# Patient Record
Sex: MALE | Race: WHITE | Employment: OTHER | URBAN - METROPOLITAN AREA
[De-identification: names, ages, dates, MRNs, and addresses within clinical notes are randomized per-mention and may not be internally consistent; named-entity substitution may affect disease eponyms.]

---

## 2019-04-28 ENCOUNTER — ED HISTORICAL/CONVERTED ENCOUNTER (OUTPATIENT)
Dept: OTHER | Age: 69
End: 2019-04-28

## 2020-01-14 ENCOUNTER — ED HISTORICAL/CONVERTED ENCOUNTER (OUTPATIENT)
Dept: OTHER | Age: 70
End: 2020-01-14

## 2020-02-06 ENCOUNTER — ED HISTORICAL/CONVERTED ENCOUNTER (OUTPATIENT)
Dept: OTHER | Age: 70
End: 2020-02-06

## 2020-07-12 ENCOUNTER — ED HISTORICAL/CONVERTED ENCOUNTER (OUTPATIENT)
Dept: OTHER | Age: 70
End: 2020-07-12

## 2020-10-09 ENCOUNTER — OFFICE VISIT (OUTPATIENT)
Dept: ENT CLINIC | Age: 70
End: 2020-10-09
Payer: MEDICARE

## 2020-10-09 VITALS
HEIGHT: 74 IN | DIASTOLIC BLOOD PRESSURE: 82 MMHG | HEART RATE: 60 BPM | BODY MASS INDEX: 26.44 KG/M2 | WEIGHT: 206 LBS | OXYGEN SATURATION: 100 % | SYSTOLIC BLOOD PRESSURE: 130 MMHG | RESPIRATION RATE: 18 BRPM | TEMPERATURE: 97.7 F

## 2020-10-09 DIAGNOSIS — J30.1 ALLERGIC RHINITIS DUE TO POLLEN, UNSPECIFIED SEASONALITY: ICD-10-CM

## 2020-10-09 DIAGNOSIS — R04.0 EPISTAXIS: ICD-10-CM

## 2020-10-09 DIAGNOSIS — G43.809 OTHER MIGRAINE WITHOUT STATUS MIGRAINOSUS, NOT INTRACTABLE: Primary | ICD-10-CM

## 2020-10-09 DIAGNOSIS — R43.1 OLFACTORY AURA: ICD-10-CM

## 2020-10-09 DIAGNOSIS — H93.13 TINNITUS OF BOTH EARS: ICD-10-CM

## 2020-10-09 PROBLEM — G43.909 MIGRAINE: Status: ACTIVE | Noted: 2020-10-09

## 2020-10-09 PROCEDURE — 99204 OFFICE O/P NEW MOD 45 MIN: CPT | Performed by: OTOLARYNGOLOGY

## 2020-10-09 NOTE — LETTER
10/9/20 Patient: Carolynn Gill YOB: 1950 Date of Visit: 10/9/2020 Bernarda Olivas MD 
[address] [address] VIA Facsimile: [phone] Dear Bernarda Olivas MD, Thank you for referring Mr. Enedina Mena to [address] [place] [address], NOSE, THROAT AND ALLERGY [facility] for evaluation. My notes for this consultation are attached. If you have questions, please do not hesitate to call me. I look forward to following your patient along with you. Sincerely, Jesse Luciano MD

## 2020-10-09 NOTE — PROGRESS NOTES
Subjective: Carolynn Gill   70 y.o.   1950     HPI     Location - nose, head, ears    Quality - headaches, olfactory disturbance, tinnitus    Severity -  moderate    Duration - months/years    Timing - ongoing    Context - pt with known hx tinnitus, migraines in past, had concussion in Jan 2020, since then headaches worse, also having sporadic episodes of olfactory aura with floral then rotting smell also with headache afterward; does have mild seasonal sinus issues, but no chronic drainage nor facial pressure/pain    Modifying Features - worse in Fall/Winter    Associated symptoms/signs - tinnitus      Review of Systems  Review of Systems   Constitutional: Negative for chills and fever. HENT: Positive for nosebleeds and tinnitus. Negative for ear pain and hearing loss. Eyes: Negative for blurred vision and double vision. Respiratory: Negative for cough, sputum production and shortness of breath. Cardiovascular: Negative for chest pain and palpitations. Gastrointestinal: Positive for nausea. Negative for heartburn and vomiting. Musculoskeletal: Negative for joint pain and neck pain. Skin: Negative. Neurological: Positive for headaches. Negative for dizziness, speech change and weakness. Endo/Heme/Allergies: Negative for environmental allergies. Does not bruise/bleed easily. Psychiatric/Behavioral: Negative for memory loss. The patient does not have insomnia. Past Medical History:   Diagnosis Date    HTN (hypertension)     Migraines     Osteitis pubis ([address] 12/2/2015     Past Surgical History:   Procedure Laterality Date    HX CARPAL TUNNEL RELEASE      HX CERVICAL FUSION      HX HERNIA REPAIR        History reviewed. No pertinent family history. Social History     Tobacco Use    Smoking status: Former Smoker    Smokeless tobacco: Never Used   Substance Use Topics    Alcohol use:  Yes     Alcohol/week: 0.0 standard drinks     Comment: one per week      Prior to Admission medications    Medication Sig Start Date End Date Taking? Authorizing Provider   amLODIPine (NORVASC) 5 mg tablet  10/6/15   Provider, Historical   losartan (COZAAR) 50 mg tablet  11/23/15   Provider, Historical   clobetasol (TEMOVATE) 0.05 % external solution  11/24/15   Provider, Historical   BIOFLAV,LEMON/VIT BCOMP&C (LIPO-FLAVONOID PLUS PO) Take  by mouth. Provider, Historical   KRILL/OM-3/DHA/EPA/PHOSPHO/AST (MEGARED OMEGA-3 KRILL OIL PO) Take  by mouth. Provider, Historical   TEA TREE OIL EX by Apply Externally route. Provider, Historical   OTHER  for kidney stones    Provider, Historical        Allergies   Allergen Reactions    Niacin Rash    Oxycodone Other (comments)     Breathing difficulty    Robitussin [Guaifenesin] Other (comments)     Throat swelling         Objective:     Visit Vitals  /82 (BP 1 Location: Left arm, BP Patient Position: Sitting)   Pulse 60   Temp 97.7 °F (36.5 °C) (Oral)   Resp 18   Ht 6' 2\" (1.88 m)   Wt 206 lb (93.4 kg)   SpO2 100%   BMI 26.45 kg/m²        Physical Exam  Vitals signs reviewed. Constitutional:       General: He is awake. Appearance: Normal appearance. He is normal weight. HENT:      Head: Normocephalic and atraumatic. Jaw: There is normal jaw occlusion. No trismus, tenderness or malocclusion. Salivary Glands: Right salivary gland is not diffusely enlarged or tender. Left salivary gland is not diffusely enlarged or tender. Right Ear: Hearing, tympanic membrane, ear canal and external ear normal.      Left Ear: Hearing, tympanic membrane, ear canal and external ear normal.      Ears:      Dawn exam findings: does not lateralize. Right Rinne: AC > BC. Left Rinne: AC > BC. Nose: No septal deviation, mucosal edema or rhinorrhea. Right Turbinates: Not enlarged, swollen or pale. Left Turbinates: Not enlarged, swollen or pale.       Right Sinus: No maxillary sinus tenderness or frontal sinus tenderness. Left Sinus: No maxillary sinus tenderness or frontal sinus tenderness. Comments: Left telangiectasia     Mouth/Throat:      Lips: Pink. Mouth: Mucous membranes are moist. No oral lesions. Dentition: Normal dentition. No gum lesions. Tongue: No lesions. Palate: No mass and lesions. Pharynx: Oropharynx is clear. Uvula midline. Tonsils: No tonsillar exudate. 0 on the right. 0 on the left. Eyes:      General: Vision grossly intact. Extraocular Movements: Extraocular movements intact. Right eye: No nystagmus. Left eye: No nystagmus. Pupils: Pupils are equal, round, and reactive to light. Neck:      Musculoskeletal: Normal range of motion. No edema or erythema. Thyroid: No thyroid mass, thyromegaly or thyroid tenderness. Trachea: Trachea and phonation normal. No tracheal tenderness. Cardiovascular:      Rate and Rhythm: Normal rate and regular rhythm. Pulmonary:      Effort: Pulmonary effort is normal.      Breath sounds: Normal breath sounds. No stridor. No wheezing. Musculoskeletal: Normal range of motion. Lymphadenopathy:      Cervical: No cervical adenopathy. Skin:     General: Skin is warm and dry. Neurological:      General: No focal deficit present. Mental Status: He is alert and oriented to person, place, and time. Mental status is at baseline. Cranial Nerves: Cranial nerves are intact. Coordination: Romberg sign negative. Gait: Gait is intact. Comments: Negative Hallpike   Psychiatric:         Mood and Affect: Mood normal.         Behavior: Behavior normal. Behavior is cooperative. Assessment/Plan:     Encounter Diagnoses   Name Primary?     Other migraine without status migrainosus, not intractable Yes    Epistaxis     Tinnitus of both ears     Allergic rhinitis due to pollen, unspecified seasonality     Olfactory aura      I reviewed head CT from Jan and Feb 2020  There is mild chronic sinus thickening bilateral maxillary sinus, no polyp/mass nor skull based lesion  His olfaction sx are aura related, not from sinus pathology  If symptoms progress will f/u, otherwise rec neurology followup    No orders of the defined types were placed in this encounter. Follow-up and Dispositions    · Return if symptoms worsen or fail to improve.

## 2021-04-04 ENCOUNTER — HOSPITAL ENCOUNTER (EMERGENCY)
Age: 71
Discharge: HOME | End: 2021-04-05
Attending: EMERGENCY MEDICINE
Payer: MEDICARE

## 2021-04-04 DIAGNOSIS — I10 HYPERTENSION, UNSPECIFIED TYPE: Primary | ICD-10-CM

## 2021-04-04 PROCEDURE — 99283 EMERGENCY DEPT VISIT LOW MDM: CPT

## 2021-04-04 RX ORDER — AZELASTINE HCL 205.5 UG/1
1 SPRAY NASAL 2 TIMES DAILY
COMMUNITY

## 2021-04-05 VITALS
OXYGEN SATURATION: 98 % | DIASTOLIC BLOOD PRESSURE: 80 MMHG | BODY MASS INDEX: 27.83 KG/M2 | HEART RATE: 85 BPM | RESPIRATION RATE: 18 BRPM | WEIGHT: 210 LBS | HEIGHT: 73 IN | SYSTOLIC BLOOD PRESSURE: 147 MMHG | TEMPERATURE: 98.3 F

## 2021-04-05 LAB
ANION GAP SERPL CALC-SCNC: 7 MMOL/L (ref 5–15)
ATRIAL RATE: 67 BPM
BASOPHILS # BLD: 0 K/UL (ref 0–0.1)
BASOPHILS NFR BLD: 0 % (ref 0–1)
BUN SERPL-MCNC: 25 MG/DL (ref 6–20)
BUN/CREAT SERPL: 26 (ref 12–20)
CA-I BLD-MCNC: 8.6 MG/DL (ref 8.5–10.1)
CALCULATED P AXIS, ECG09: 32 DEGREES
CALCULATED R AXIS, ECG10: -34 DEGREES
CALCULATED T AXIS, ECG11: 18 DEGREES
CHLORIDE SERPL-SCNC: 103 MMOL/L (ref 97–108)
CO2 SERPL-SCNC: 29 MMOL/L (ref 21–32)
CREAT SERPL-MCNC: 0.98 MG/DL (ref 0.7–1.3)
DIAGNOSIS, 93000: NORMAL
DIFFERENTIAL METHOD BLD: ABNORMAL
EOSINOPHIL # BLD: 0.3 K/UL (ref 0–0.4)
EOSINOPHIL NFR BLD: 5 % (ref 0–7)
ERYTHROCYTE [DISTWIDTH] IN BLOOD BY AUTOMATED COUNT: 12 % (ref 11.5–14.5)
GLUCOSE SERPL-MCNC: 123 MG/DL (ref 65–100)
HCT VFR BLD AUTO: 44 % (ref 36.6–50.3)
HGB BLD-MCNC: 14.6 G/DL (ref 12.1–17)
IMM GRANULOCYTES # BLD AUTO: 0 K/UL (ref 0–0.04)
IMM GRANULOCYTES NFR BLD AUTO: 0 % (ref 0–0.5)
LYMPHOCYTES # BLD: 1.2 K/UL (ref 0.8–3.5)
LYMPHOCYTES NFR BLD: 22 % (ref 12–49)
MCH RBC QN AUTO: 30.6 PG (ref 26–34)
MCHC RBC AUTO-ENTMCNC: 33.2 G/DL (ref 30–36.5)
MCV RBC AUTO: 92.2 FL (ref 80–99)
MONOCYTES # BLD: 0.8 K/UL (ref 0–1)
MONOCYTES NFR BLD: 15 % (ref 5–13)
NEUTS SEG # BLD: 3 K/UL (ref 1.8–8)
NEUTS SEG NFR BLD: 58 % (ref 32–75)
P-R INTERVAL, ECG05: 174 MS
PLATELET # BLD AUTO: 108 K/UL (ref 150–400)
PMV BLD AUTO: 10.7 FL (ref 8.9–12.9)
POTASSIUM SERPL-SCNC: 4.2 MMOL/L (ref 3.5–5.1)
Q-T INTERVAL, ECG07: 370 MS
QRS DURATION, ECG06: 84 MS
QTC CALCULATION (BEZET), ECG08: 390 MS
RBC # BLD AUTO: 4.77 M/UL (ref 4.1–5.7)
SODIUM SERPL-SCNC: 139 MMOL/L (ref 136–145)
TROPONIN I SERPL-MCNC: <0.05 NG/ML
VENTRICULAR RATE, ECG03: 67 BPM
WBC # BLD AUTO: 5.3 K/UL (ref 4.1–11.1)

## 2021-04-05 PROCEDURE — 93005 ELECTROCARDIOGRAM TRACING: CPT

## 2021-04-05 PROCEDURE — 84484 ASSAY OF TROPONIN QUANT: CPT

## 2021-04-05 PROCEDURE — 85025 COMPLETE CBC W/AUTO DIFF WBC: CPT

## 2021-04-05 PROCEDURE — 80048 BASIC METABOLIC PNL TOTAL CA: CPT

## 2021-04-05 NOTE — ED TRIAGE NOTES
Pt c/o elevated blood pressure and general complaints of not feeling well; recently switched BP meds

## 2021-04-05 NOTE — ED PROVIDER NOTES
EMERGENCY DEPARTMENT HISTORY AND PHYSICAL EXAM      Date: 4/4/2021  Patient Name: Yadira Moore    History of Presenting Illness     Chief Complaint   Patient presents with    Hypertension       History Provided By: Patient    HPI: Yadira Moore, 78 y.o. male   presents to the ED with cc of hypertension. Patient with a history of hypertension came to emergency room complaining of elevated blood pressure reading with a systolic greater than 928 just prior to arrival.  Patient states that patient was resting at home felt mild throbbing of the head. Patient took the blood pressure reading at the time and the systolic blood pressure greater 190. Patient states that he is in process of readjusting his antihypertensive by his cardiologist.  Patient is currently on losartan and he has been compliant with the medication. Patient is also is actively treated by allergist for his severe seasonal allergy. PCP: Ada Mosley, DO    No current facility-administered medications on file prior to encounter. Current Outpatient Medications on File Prior to Encounter   Medication Sig Dispense Refill    azelastine (ASTEPRO) 205.5 mcg (0.15 %) 1 [place] by Both Nostrils route two (2) times a day.  losartan (COZAAR) 50 mg tablet 100 mg.  clobetasol (TEMOVATE) 0.05 % external solution       BIOFLAV,LEMON/VIT BCOMP&C (LIPO-FLAVONOID PLUS PO) Take  by mouth.  KRILL/OM-3/DHA/EPA/PHOSPHO/AST (MEGARED OMEGA-3 KRILL OIL PO) Take  by mouth.  TEA TREE OIL EX by Apply Externally route.  OTHER  for kidney stones         Past History     Past Medical History:  Past Medical History:   Diagnosis Date    HTN (hypertension)     Migraines     Osteitis [address]is ([facility] [address] 12/2/2015       Past Surgical History:  Past Surgical History:   Procedure Laterality Date    HX CARPAL TUNNEL RELEASE      HX CERVICAL FUSION      HX HERNIA REPAIR         Family History:  History reviewed.  No pertinent family history. Social History:  Social History     Tobacco Use    Smoking status: Former Smoker    Smokeless tobacco: Never Used   Substance Use Topics    Alcohol use: Yes     Alcohol/week: 0.0 standard drinks     Comment: one per week    Drug use: Not on file       Allergies: Allergies   Allergen Reactions    Metoprolol Unknown (comments)    Niacin Rash    Oxycodone Other (comments)     Breathing difficulty    Robitussin [Guaifenesin] Other (comments)     Throat swelling         Review of Systems   Review of Systems   Constitutional: Negative for chills and fever. HENT: Negative for sore throat. Eyes: Negative for visual disturbance. Respiratory: Negative for cough and shortness of breath. Cardiovascular: Negative for chest pain and leg swelling. Gastrointestinal: Negative for abdominal pain and vomiting. Endocrine: Negative for polyuria. Genitourinary: Negative for difficulty urinating. Musculoskeletal: Negative for arthralgias. Skin: Negative for rash. Neurological: Negative for headaches. Psychiatric/Behavioral: Negative for suicidal ideas. All other systems reviewed and are negative. Physical Exam   Physical Exam  Vitals signs and nursing note reviewed. Constitutional:       Appearance: Normal appearance. HENT:      Head: Normocephalic and atraumatic. Nose: No congestion. Mouth/Throat:      Mouth: Mucous membranes are moist.   Eyes:      General: No scleral icterus. Conjunctiva/sclera: Conjunctivae normal.   Neck:      Musculoskeletal: Neck supple. Cardiovascular:      Rate and Rhythm: Normal rate and regular rhythm. Heart sounds: Normal heart sounds. Pulmonary:      Effort: Pulmonary effort is normal.      Breath sounds: Normal breath sounds. Abdominal:      General: Abdomen is flat. Bowel sounds are normal.      Palpations: Abdomen is soft. Musculoskeletal:         General: No swelling. Right lower leg: No edema.       Left lower leg: No edema. Skin:     General: Skin is warm and dry. Comments: Generalized erythema. Patient states that it is at his baseline due to seasonal allergies. Neurological:      General: No focal deficit present. Mental Status: He is alert. Psychiatric:         Mood and Affect: Mood normal.         Diagnostic Study Results     Labs -     Recent Results (from the past 12 hour(s))   CBC WITH AUTOMATED DIFF    Collection Time: 04/05/21 12:15 AM   Result Value Ref Range    WBC 5.3 4.1 - 11.1 K/uL    RBC 4.77 4.10 - 5.70 M/uL    HGB 14.6 12.1 - 17.0 g/dL    HCT 44.0 36.6 - 50.3 %    MCV 92.2 80.0 - 99.0 FL    MCH 30.6 26.0 - 34.0 PG    MCHC 33.2 30.0 - 36.5 g/dL    RDW 12.0 11.5 - 14.5 %    PLATELET 290 (L) 728 - 400 K/uL    MPV 10.7 8.9 - 12.9 FL    NEUTROPHILS 58 32 - 75 %    LYMPHOCYTES 22 12 - 49 %    MONOCYTES 15 (H) 5 - 13 %    EOSINOPHILS 5 0 - 7 %    BASOPHILS 0 0 - 1 %    IMMATURE GRANULOCYTES 0 0.0 - 0.5 %    ABS. NEUTROPHILS 3.0 1.8 - 8.0 K/UL    ABS. LYMPHOCYTES 1.2 0.8 - 3.5 K/UL    ABS. MONOCYTES 0.8 0.0 - 1.0 K/UL    ABS. EOSINOPHILS 0.3 0.0 - 0.4 K/UL    ABS. BASOPHILS 0.0 0.0 - 0.1 K/UL    ABS. IMM.  GRANS. 0.0 0.00 - 0.04 K/UL    DF AUTOMATED     METABOLIC PANEL, BASIC    Collection Time: 04/05/21 12:15 AM   Result Value Ref Range    Sodium 139 136 - 145 mmol/L    Potassium 4.2 3.5 - 5.1 mmol/L    Chloride 103 97 - 108 mmol/L    CO2 29 21 - 32 mmol/L    Anion gap 7 5 - 15 mmol/L    Glucose 123 (H) 65 - 100 mg/dL    BUN 25 (H) 6 - 20 mg/dL    Creatinine 0.98 0.70 - 1.30 mg/dL    BUN/Creatinine ratio 26 (H) 12 - 20      GFR est AA >60 >60 ml/min/1.73m2    GFR est non-AA >60 >60 ml/min/1.73m2    Calcium 8.6 8.5 - 10.1 mg/dL   TROPONIN I    Collection Time: 04/05/21 12:15 AM   Result Value Ref Range    Troponin-I, Qt. <0.05 <0.05 ng/mL       Radiologic Studies -   No orders to display     CT Results  (Last 48 hours)    None        CXR Results  (Last 48 hours)    None            Medical Decision Making   I am the first provider for this patient. I reviewed the vital signs, available nursing notes, past medical history, past surgical history, family history and social history. Vital Signs-Reviewed the patient's vital signs. Patient Vitals for the past 12 hrs:   Temp Pulse Resp BP SpO2   04/05/21 0058    (!) 147/80    04/04/21 2342 98.3 °F (36.8 °C) 85 18 (!) 154/95 98 %       Records Reviewed:     Provider Notes (Medical Decision Making):       ED Course:   Initial assessment performed. The patients presenting problems have been discussed, and they are in agreement with the care plan formulated and outlined with them. I have encouraged them to ask questions as they arise throughout their visit. ED Course as of Apr 05 0107   Mon Apr 05, 2021   0026 Normal sinus rhythm at 67 left axis deviation normal QT no ectopy nonspecific ST-T wave changes no STEMI    [SK]      ED Course User Index  [SK] Felipa Figueroa MD     Blood pressure improved. No apparent distress. No respiratory distress. No complaint of headache or chest pain or shortness of breath. Neuro grossly nonfocal.    PROCEDURES      Disposition: Condition stable   DC- Adult Discharges: All of the diagnostic tests were reviewed and questions answered. Diagnosis, care plan and treatment options were discussed. understand instructions and will follow up as directed. The patients results have been reviewed with them. They have been counseled regarding their diagnosis. The patient verbally convey understanding and agreement of the signs, symptoms, diagnosis, treatment and prognosis and additionally agrees to follow up as recommended. They also agree with the care-plan and convey that all of their questions have been answered.   I have also put together some discharge instructions for them that include: 1) educational information regarding their diagnosis, 2) how to care for their diagnosis at home, as well a 3) list of reasons why they would want to return to the ED prior to their follow-up appointment, should their condition change. PLAN:  1. Current Discharge Medication List        2. Follow-up Information     Follow up With Specialties Details Why Contact Info    Follow up with your primary care physician  Schedule an appointment as soon as possible for a visit in 3 days As needed         Return to ED if worse     Diagnosis     Clinical Impression:   1. Hypertension, unspecified type        Please note that this dictation was completed with [employer], the computer voice recognition software. Quite often unanticipated grammatical, syntax, homophones, and other interpretive errors are inadvertently transcribed by the computer software. Please disregard these errors. Please excuse any errors that have escaped final proofreading. Thank you.

## 2022-03-18 PROBLEM — G43.909 MIGRAINE: Status: ACTIVE | Noted: 2020-10-09

## 2022-03-18 PROBLEM — R04.0 EPISTAXIS: Status: ACTIVE | Noted: 2020-10-09

## 2022-03-18 PROBLEM — J30.1 ALLERGIC RHINITIS DUE TO POLLEN: Status: ACTIVE | Noted: 2020-10-09

## 2022-03-19 PROBLEM — H93.13 TINNITUS OF BOTH EARS: Status: ACTIVE | Noted: 2020-10-09

## 2022-03-19 PROBLEM — R43.1 OLFACTORY AURA: Status: ACTIVE | Noted: 2020-10-09

## 2022-06-21 ENCOUNTER — HOSPITAL ENCOUNTER (EMERGENCY)
Age: 72
Discharge: HOME | End: 2022-06-21
Attending: EMERGENCY MEDICINE
Payer: MEDICARE

## 2022-06-21 ENCOUNTER — APPOINTMENT (OUTPATIENT)
Dept: CT IMAGING | Age: 72
End: 2022-06-21
Attending: EMERGENCY MEDICINE
Payer: MEDICARE

## 2022-06-21 ENCOUNTER — APPOINTMENT (OUTPATIENT)
Dept: GENERAL RADIOLOGY | Age: 72
End: 2022-06-21
Attending: STUDENT IN AN ORGANIZED HEALTH CARE EDUCATION/TRAINING PROGRAM
Payer: MEDICARE

## 2022-06-21 VITALS
HEART RATE: 64 BPM | WEIGHT: 200 LBS | RESPIRATION RATE: 16 BRPM | SYSTOLIC BLOOD PRESSURE: 132 MMHG | DIASTOLIC BLOOD PRESSURE: 82 MMHG | BODY MASS INDEX: 26.51 KG/M2 | OXYGEN SATURATION: 99 % | HEIGHT: 73 IN | TEMPERATURE: 97.8 F

## 2022-06-21 DIAGNOSIS — R06.02 SOB (SHORTNESS OF BREATH): Primary | ICD-10-CM

## 2022-06-21 LAB
ALBUMIN SERPL-MCNC: 4.3 G/DL (ref 3.5–5)
ALBUMIN/GLOB SERPL: 1.1 {RATIO} (ref 1.1–2.2)
ALP SERPL-CCNC: 84 U/L (ref 45–117)
ALT SERPL-CCNC: 36 U/L (ref 12–78)
ANION GAP SERPL CALC-SCNC: 6 MMOL/L (ref 5–15)
AST SERPL W P-5'-P-CCNC: 21 U/L (ref 15–37)
BASOPHILS # BLD: 0 K/UL (ref 0–0.1)
BASOPHILS NFR BLD: 0 % (ref 0–1)
BILIRUB SERPL-MCNC: 0.8 MG/DL (ref 0.2–1)
BUN SERPL-MCNC: 23 MG/DL (ref 6–20)
BUN/CREAT SERPL: 27 (ref 12–20)
CA-I BLD-MCNC: 9.4 MG/DL (ref 8.5–10.1)
CHLORIDE SERPL-SCNC: 104 MMOL/L (ref 97–108)
CO2 SERPL-SCNC: 29 MMOL/L (ref 21–32)
CREAT SERPL-MCNC: 0.85 MG/DL (ref 0.7–1.3)
DIFFERENTIAL METHOD BLD: ABNORMAL
EOSINOPHIL # BLD: 0.3 K/UL (ref 0–0.4)
EOSINOPHIL NFR BLD: 4 % (ref 0–7)
ERYTHROCYTE [DISTWIDTH] IN BLOOD BY AUTOMATED COUNT: 12.1 % (ref 11.5–14.5)
GLOBULIN SER CALC-MCNC: 4 G/DL (ref 2–4)
GLUCOSE SERPL-MCNC: 106 MG/DL (ref 65–100)
HCT VFR BLD AUTO: 45.9 % (ref 36.6–50.3)
HGB BLD-MCNC: 15.2 G/DL (ref 12.1–17)
IMM GRANULOCYTES # BLD AUTO: 0 K/UL (ref 0–0.04)
IMM GRANULOCYTES NFR BLD AUTO: 0 % (ref 0–0.5)
LYMPHOCYTES # BLD: 2.2 K/UL (ref 0.8–3.5)
LYMPHOCYTES NFR BLD: 29 % (ref 12–49)
MCH RBC QN AUTO: 30.5 PG (ref 26–34)
MCHC RBC AUTO-ENTMCNC: 33.1 G/DL (ref 30–36.5)
MCV RBC AUTO: 92.2 FL (ref 80–99)
MONOCYTES # BLD: 0.9 K/UL (ref 0–1)
MONOCYTES NFR BLD: 12 % (ref 5–13)
NEUTS SEG # BLD: 4.1 K/UL (ref 1.8–8)
NEUTS SEG NFR BLD: 55 % (ref 32–75)
NRBC # BLD: 0 K/UL (ref 0–0.01)
NRBC BLD-RTO: 0 PER 100 WBC
PLATELET # BLD AUTO: 143 K/UL (ref 150–400)
PMV BLD AUTO: 11.2 FL (ref 8.9–12.9)
POTASSIUM SERPL-SCNC: 4.8 MMOL/L (ref 3.5–5.1)
PROT SERPL-MCNC: 8.3 G/DL (ref 6.4–8.2)
RBC # BLD AUTO: 4.98 M/UL (ref 4.1–5.7)
SODIUM SERPL-SCNC: 139 MMOL/L (ref 136–145)
TROPONIN-HIGH SENSITIVITY: 7 NG/L (ref 0–76)
WBC # BLD AUTO: 7.5 K/UL (ref 4.1–11.1)

## 2022-06-21 PROCEDURE — 99285 EMERGENCY DEPT VISIT HI MDM: CPT

## 2022-06-21 PROCEDURE — 80053 COMPREHEN METABOLIC PANEL: CPT

## 2022-06-21 PROCEDURE — 93005 ELECTROCARDIOGRAM TRACING: CPT

## 2022-06-21 PROCEDURE — 85025 COMPLETE CBC W/AUTO DIFF WBC: CPT

## 2022-06-21 PROCEDURE — 71045 X-RAY EXAM CHEST 1 VIEW: CPT

## 2022-06-21 PROCEDURE — 36415 COLL VENOUS BLD VENIPUNCTURE: CPT

## 2022-06-21 PROCEDURE — 84484 ASSAY OF TROPONIN QUANT: CPT

## 2022-06-21 PROCEDURE — 71275 CT ANGIOGRAPHY CHEST: CPT

## 2022-06-21 PROCEDURE — 74011250637 HC RX REV CODE- 250/637: Performed by: EMERGENCY MEDICINE

## 2022-06-21 PROCEDURE — 74011000636 HC RX REV CODE- 636: Performed by: EMERGENCY MEDICINE

## 2022-06-21 RX ORDER — ASPIRIN 325 MG
325 TABLET ORAL ONCE
Status: COMPLETED | OUTPATIENT
Start: 2022-06-21 | End: 2022-06-21

## 2022-06-21 RX ADMIN — ASPIRIN 325 MG ORAL TABLET 325 MG: 325 PILL ORAL at 17:51

## 2022-06-21 RX ADMIN — IOPAMIDOL 100 ML: 755 INJECTION, SOLUTION INTRAVENOUS at 20:05

## 2022-06-21 NOTE — ED TRIAGE NOTES
[facility] pt stated that he felt a chill that went from his left arm to his right arm and SOB; pt stated that he rested a bit but then pain started, spoke with his PCP who told him to go to an urgent care center who after evaluating pt, recommended that he come to the ED for a further workup

## 2022-06-21 NOTE — ED PROVIDER NOTES
EMERGENCY DEPARTMENT HISTORY AND PHYSICAL EXAM      Date: 6/21/2022  Patient Name: Tim Conklin      History of Presenting Illness     Chief Complaint   Patient presents with    Chest Pain       History Provided By: Patient    HPI: Tim Conklin, 81 y.o. male with a past medical history significant for HTN, anxiety presents to the ED with cc of chest pain, sob. Endorsing episode of \"chill across my chest and arms\" this AM that went away, then tried to go up the stairs and had worsening CP and SOB. Continued central chest pain and SOB now even at rest. Denies LE edema or hx of blood clots. Denies cough, N/V/D. States he has had this before and was told it was anxiety. Went to urgent care where he had labs and EKG and told to go to ER for further workup of possible blood clot. +Sick contact in daughter with ear infection. There are no other complaints, changes, or physical findings at this time. PCP: Andrzej Infante,     Current Facility-Administered Medications   Medication Dose Route Frequency Provider Last Rate Last Admin    aspirin tablet 325 mg  325 mg Oral ONCE Bridgette Dong MD         Current Outpatient Medications   Medication Sig Dispense Refill    azelastine (ASTEPRO) 205.5 mcg (0.15 %) 1 [place] by Both Nostrils route two (2) times a day. losartan (COZAAR) 50 mg tablet 100 mg.      clobetasol (TEMOVATE) 0.05 % external solution       BIOFLAV,LEMON/VIT BCOMP&C (LIPO-FLAVONOID PLUS PO) Take  by mouth. KRILL/OM-3/DHA/EPA/PHOSPHO/AST (MEGARED OMEGA-3 KRILL OIL PO) Take  by mouth. TEA TREE OIL EX by Apply Externally route.       OTHER  for kidney stones         Past History     Past Medical History:  Past Medical History:   Diagnosis Date    HTN (hypertension)     Migraines     Osteitis pubis ([address] 12/2/2015       Past Surgical History:  Past Surgical History:   Procedure Laterality Date    HX CARPAL TUNNEL RELEASE      HX CERVICAL FUSION      HX HERNIA REPAIR Family History:  No family history on file. Social History:  Social History     Tobacco Use    Smoking status: Former Smoker    Smokeless tobacco: Never Used   Substance Use Topics    Alcohol use: Yes     Alcohol/week: 0.0 standard drinks     Comment: one per week    Drug use: Not on file       Allergies: Allergies   Allergen Reactions    Metoprolol Unknown (comments)    Niacin Rash    Oxycodone Other (comments)     Breathing difficulty    Robitussin [Guaifenesin] Other (comments)     Throat swelling         Review of Systems   Constitutional: Negative except as in HPI. Eyes: Negative except as in HPI.  ENT: Negative except as in HPI. Cardiovascular: Negative except as in HPI. Respiratory: Negative except as in HPI. Gastrointestinal: Negative except as in HPI. Genitourinary: Negative except as in HPI. Musculoskeletal: Negative except as in HPI. Integumentary: Negative except as in HPI. Neurological: Negative except as in HPI. Psychiatric: Negative except as in HPI. Endocrine: Negative except as in HPI. Hematologic/Lymphatic: Negative except as in HPI. Allergic/Immunologic: Negative except as in HPI. Physical Exam   Constitutional: Awake and alert, interactive, NAD  Eyes: PERRL, no injection or scleral icterus, no discharge  HEENT: NCAT, neck supple, MMM, no oropharyngeal exudates  CV: RRR, no m/r/g  Respiratory: CTAB, no r/r/w  GI: Abd soft, nondistended, nontender  : Deferred  MSK: FROM, no joint effusions or edema  Skin: No rashes  Neuro: CN2-12 intact, symmetric facies, fluent speech. Psych: Well-groomed, normal speech, behavior, appropriate mood    Lab and Diagnostic Study Results     Labs -   No results found for this or any previous visit (from the past 12 hour(s)).     Radiologic Studies -   [unfilled]  CT Results  (Last 48 hours)      None          CXR Results  (Last 48 hours)                 06/21/22 1639  XR CHEST PORT Final result    Impression:  The cardiomediastinal silhouette is appropriate for age, technique,   and lung expansion. Pulmonary vasculature is not congested. The lungs are   essentially clear. No effusion or pneumothorax is seen. Narrative:  1 view                   Medical Decision Making and ED Course   - I am the first and primary provider for this patient AND AM THE PRIMARY PROVIDER OF RECORD. - I reviewed the vital signs, available nursing notes, past medical history, past surgical history, family history and social history. - Initial assessment performed. The patients presenting problems have been discussed, and the staff are in agreement with the care plan formulated and outlined with them. I have encouraged them to ask questions as they arise throughout their visit. Vital Signs-Reviewed the patient's vital signs. Patient Vitals for the past 12 hrs:   Temp Pulse Resp BP SpO2   06/21/22 1615 97.7 °F (36.5 °C) 69 16 136/84 98 %       EKG interpretation: [email or web address] w/single PVC, nl QRS/QTc, left axis, no ADALID/STD or nonanatomic TWI. Provider Notes (Medical Decision Making):   72M w/CP/SOB, r/o ACS. EKG nonischemic but will give ASA. Less likely PE but given continued sx here even at rest will get CTA PE to r/o. Dispo pending workup. ED Course:       ED Course as of 06/22/22 1741   Tue Jun 21, 2022   1708 XR CHEST PORT    IMPRESSION  The cardiomediastinal silhouette is appropriate for age, technique,  and lung expansion. Pulmonary vasculature is not congested. The lungs are  essentially clear. No effusion or pneumothorax is seen. [YA]   1911 Troponin-High Sensitivity: 7 [YA]   200 81year old male with recent sob, chilld, cough. Here to be evalauted for PE. Pending CTA chest, repeat troponin   [KK]   2048 CTA negative. Patient updated of results.  No further questions, comfortable with discharge [KK]      ED Course User Index  [KK] Torres Erazo MD  [YA] Kavya Rivas MD             Disposition     Disposition: DC- Adult Discharges: All of the diagnostic tests were reviewed and questions answered. Diagnosis, care plan and treatment options were discussed. The patient understands the instructions and will follow up as directed. The patients results have been reviewed with them. They have been counseled regarding their diagnosis. The patient verbally convey understanding and agreement of the signs, symptoms, diagnosis, treatment and prognosis and additionally agrees to follow up as recommended with their PCP in 24 - 48 hours. They also agree with the care-plan and convey that all of their questions have been answered. I have also put together some discharge instructions for them that include: 1) educational information regarding their diagnosis, 2) how to care for their diagnosis at home, as well a 3) list of reasons why they would want to return to the ED prior to their follow-up appointment, should their condition change. Discharged      Diagnosis     Clinical Impression:   1. SOB (shortness of breath)        Attestations:     Frank Wolf MD

## 2022-06-22 LAB
ATRIAL RATE: 76 BPM
ATRIAL RATE: 86 BPM
CALCULATED P AXIS, ECG09: 47 DEGREES
CALCULATED P AXIS, ECG09: 68 DEGREES
CALCULATED R AXIS, ECG10: -39 DEGREES
CALCULATED R AXIS, ECG10: 64 DEGREES
CALCULATED T AXIS, ECG11: 47 DEGREES
CALCULATED T AXIS, ECG11: 49 DEGREES
DIAGNOSIS, 93000: NORMAL
DIAGNOSIS, 93000: NORMAL
P-R INTERVAL, ECG05: 148 MS
P-R INTERVAL, ECG05: 160 MS
Q-T INTERVAL, ECG07: 358 MS
Q-T INTERVAL, ECG07: 360 MS
QRS DURATION, ECG06: 72 MS
QRS DURATION, ECG06: 78 MS
QTC CALCULATION (BEZET), ECG08: 405 MS
QTC CALCULATION (BEZET), ECG08: 428 MS
VENTRICULAR RATE, ECG03: 76 BPM
VENTRICULAR RATE, ECG03: 86 BPM

## 2022-06-22 NOTE — DISCHARGE INSTRUCTIONS
Thank you! Thank you for allowing me to care for you in the emergency department. I sincerely hope that you are satisfied with your visit today. It is my goal to provide you with excellent care. Below you will find a list of your labs and imaging from your visit today. Should you have any questions regarding these results please do not hesitate to call the emergency department. Labs -     Recent Results (from the past 12 hour(s))   CBC WITH AUTOMATED DIFF    Collection Time: 06/21/22  5:59 PM   Result Value Ref Range    WBC 7.5 4.1 - 11.1 K/uL    RBC 4.98 4.10 - 5.70 M/uL    HGB 15.2 12.1 - 17.0 g/dL    HCT 45.9 36.6 - 50.3 %    MCV 92.2 80.0 - 99.0 FL    MCH 30.5 26.0 - 34.0 PG    MCHC 33.1 30.0 - 36.5 g/dL    RDW 12.1 11.5 - 14.5 %    PLATELET 846 (L) 743 - 400 K/uL    MPV 11.2 8.9 - 12.9 FL    NRBC 0.0 0.0  WBC    ABSOLUTE NRBC 0.00 0.00 - 0.01 K/uL    NEUTROPHILS 55 32 - 75 %    LYMPHOCYTES 29 12 - 49 %    MONOCYTES 12 5 - 13 %    EOSINOPHILS 4 0 - 7 %    BASOPHILS 0 0 - 1 %    IMMATURE GRANULOCYTES 0 0 - 0.5 %    ABS. NEUTROPHILS 4.1 1.8 - 8.0 K/UL    ABS. LYMPHOCYTES 2.2 0.8 - 3.5 K/UL    ABS. MONOCYTES 0.9 0.0 - 1.0 K/UL    ABS. EOSINOPHILS 0.3 0.0 - 0.4 K/UL    ABS. BASOPHILS 0.0 0.0 - 0.1 K/UL    ABS. IMM. GRANS. 0.0 0.00 - 0.04 K/UL    DF AUTOMATED     METABOLIC PANEL, COMPREHENSIVE    Collection Time: 06/21/22  5:59 PM   Result Value Ref Range    Sodium 139 136 - 145 mmol/L    Potassium 4.8 3.5 - 5.1 mmol/L    Chloride 104 97 - 108 mmol/L    CO2 29 21 - 32 mmol/L    Anion gap 6 5 - 15 mmol/L    Glucose 106 (H) 65 - 100 mg/dL    BUN 23 (H) 6 - 20 mg/dL    Creatinine 0.85 0.70 - 1.30 mg/dL    BUN/Creatinine ratio 27 (H) 12 - 20      GFR est AA >60 >60 ml/min/1.73m2    GFR est non-AA >60 >60 ml/min/1.73m2    Calcium 9.4 8.5 - 10.1 mg/dL    Bilirubin, total 0.8 0.2 - 1.0 mg/dL    AST (SGOT) 21 15 - 37 U/L    ALT (SGPT) 36 12 - 78 U/L    Alk.  phosphatase 84 45 - 117 U/L    Protein, total 8.3 (H) 6.4 - 8.2 g/dL    Albumin 4.3 3.5 - 5.0 g/dL    Globulin 4.0 2.0 - 4.0 g/dL    A-G Ratio 1.1 1.1 - 2.2     TROPONIN-HIGH SENSITIVITY    Collection Time: 06/21/22  5:59 PM   Result Value Ref Range    Troponin-High Sensitivity 7 0 - 76 ng/L       Radiologic Studies -   CTA CHEST W OR W WO CONT   Final Result   No acute cardiopulmonary process. No PE. Clear lungs. XR CHEST PORT   Final Result   The cardiomediastinal silhouette is appropriate for age, technique,   and lung expansion. Pulmonary vasculature is not congested. The lungs are   essentially clear. No effusion or pneumothorax is seen. CT Results  (Last 48 hours)                 06/21/22 2004  CTA CHEST W OR W WO CONT Final result    Impression:  No acute cardiopulmonary process. No PE. Clear lungs. Narrative:  Technique:      Axial contrast-enhanced axial images with multiplanar and MIP reformats of   the chest according to CT pulmonary angiogram protocol. 100 cc of Isovue-370   used for IV contrast.          Dose Reduction:  All CT scans at this facility are performed using dose   reduction optimization techniques as appropriate to a performed exam including   the following: Automated exposure control, adjustments of the mA and/or kV   according to patient size, or use of iterative reconstruction technique. Comparison:  Chest x-ray June 21, 2022       CT chest :   No PE. Normal cardiac size. Normal mediastinal structures. No mass or   lymphadenopathy. There is mild breathing motion artifact present. Lungs appear   grossly clear. No infiltrate. No pneumothorax or effusion. Unremarkable upper   abdominal images. CXR Results  (Last 48 hours)                 06/21/22 1639  XR CHEST PORT Final result    Impression:  The cardiomediastinal silhouette is appropriate for age, technique,   and lung expansion. Pulmonary vasculature is not congested. The lungs are   essentially clear. No effusion or pneumothorax is seen. Narrative:  1 view                      If you feel that you have not received excellent quality care or timely care, please ask to speak to the nurse manager. Please choose us in the future for your continued health care needs. ------------------------------------------------------------------------------------------------------------  The exam and treatment you received in the Emergency Department were for an urgent problem and are not intended as complete care. It is important that you follow-up with a doctor, nurse practitioner, or physician assistant to:  (1) confirm your diagnosis,  (2) re-evaluation of changes in your illness and treatment, and  (3) for ongoing care. If your symptoms become worse or you do not improve as expected and you are unable to reach your usual health care provider, you should return to the Emergency Department. We are available 24 hours a day. Please take your discharge instructions with you when you go to your follow-up appointment. If you have any problem arranging a follow-up appointment, contact the Emergency Department immediately. If a prescription has been provided, please have it filled as soon as possible to prevent a delay in treatment. Read the entire medication instruction sheet provided to you by the pharmacy. If you have any questions or reservations about taking the medication due to side effects or interactions with other medications, please call your primary care physician or contact the ER to speak with the charge nurse. Make an appointment with your family doctor or the physician you were referred to for follow-up of this visit as instructed on your discharge paperwork, as this is a mandatory follow-up. Return to the ER if you are unable to be seen or if you are unable to be seen in a timely manner. If you have any problem arranging the follow-up visit, contact the Emergency Department immediately.

## 2022-12-27 ENCOUNTER — HOSPITAL ENCOUNTER (EMERGENCY)
Age: 72
Discharge: HOME | End: 2022-12-27
Attending: EMERGENCY MEDICINE
Payer: MEDICARE

## 2022-12-27 ENCOUNTER — APPOINTMENT (OUTPATIENT)
Dept: GENERAL RADIOLOGY | Age: 72
End: 2022-12-27
Attending: EMERGENCY MEDICINE
Payer: MEDICARE

## 2022-12-27 VITALS
OXYGEN SATURATION: 97 % | HEIGHT: 73 IN | HEART RATE: 63 BPM | SYSTOLIC BLOOD PRESSURE: 140 MMHG | RESPIRATION RATE: 14 BRPM | DIASTOLIC BLOOD PRESSURE: 80 MMHG | WEIGHT: 196 LBS | TEMPERATURE: 97.6 F | BODY MASS INDEX: 25.98 KG/M2

## 2022-12-27 DIAGNOSIS — R07.9 CHEST PAIN, UNSPECIFIED TYPE: Primary | ICD-10-CM

## 2022-12-27 LAB
ALBUMIN SERPL-MCNC: 3.9 G/DL (ref 3.5–5)
ALBUMIN/GLOB SERPL: 0.9 {RATIO} (ref 1.1–2.2)
ALP SERPL-CCNC: 100 U/L (ref 45–117)
ALT SERPL-CCNC: 41 U/L (ref 12–78)
ANION GAP SERPL CALC-SCNC: 7 MMOL/L (ref 5–15)
AST SERPL W P-5'-P-CCNC: 19 U/L (ref 15–37)
ATRIAL RATE: 75 BPM
BASOPHILS # BLD: 0.1 K/UL (ref 0–0.1)
BASOPHILS NFR BLD: 1 % (ref 0–1)
BILIRUB SERPL-MCNC: 0.9 MG/DL (ref 0.2–1)
BUN SERPL-MCNC: 13 MG/DL (ref 6–20)
BUN/CREAT SERPL: 15 (ref 12–20)
CA-I BLD-MCNC: 9.4 MG/DL (ref 8.5–10.1)
CALCULATED P AXIS, ECG09: 54 DEGREES
CALCULATED R AXIS, ECG10: -45 DEGREES
CALCULATED T AXIS, ECG11: 47 DEGREES
CHLORIDE SERPL-SCNC: 103 MMOL/L (ref 97–108)
CO2 SERPL-SCNC: 24 MMOL/L (ref 21–32)
CREAT SERPL-MCNC: 0.84 MG/DL (ref 0.7–1.3)
DIAGNOSIS, 93000: NORMAL
DIFFERENTIAL METHOD BLD: ABNORMAL
EOSINOPHIL # BLD: 0.6 K/UL (ref 0–0.4)
EOSINOPHIL NFR BLD: 8 % (ref 0–7)
ERYTHROCYTE [DISTWIDTH] IN BLOOD BY AUTOMATED COUNT: 12 % (ref 11.5–14.5)
GLOBULIN SER CALC-MCNC: 4.4 G/DL (ref 2–4)
GLUCOSE SERPL-MCNC: 102 MG/DL (ref 65–100)
HCT VFR BLD AUTO: 48.2 % (ref 36.6–50.3)
HGB BLD-MCNC: 15.8 G/DL (ref 12.1–17)
IMM GRANULOCYTES # BLD AUTO: 0 K/UL (ref 0–0.04)
IMM GRANULOCYTES NFR BLD AUTO: 0 % (ref 0–0.5)
LYMPHOCYTES # BLD: 3 K/UL (ref 0.8–3.5)
LYMPHOCYTES NFR BLD: 40 % (ref 12–49)
MCH RBC QN AUTO: 30.1 PG (ref 26–34)
MCHC RBC AUTO-ENTMCNC: 32.8 G/DL (ref 30–36.5)
MCV RBC AUTO: 91.8 FL (ref 80–99)
MONOCYTES # BLD: 0.9 K/UL (ref 0–1)
MONOCYTES NFR BLD: 12 % (ref 5–13)
NEUTS SEG # BLD: 2.9 K/UL (ref 1.8–8)
NEUTS SEG NFR BLD: 39 % (ref 32–75)
NRBC # BLD: 0 K/UL (ref 0–0.01)
NRBC BLD-RTO: 0 PER 100 WBC
P-R INTERVAL, ECG05: 180 MS
PLATELET # BLD AUTO: 144 K/UL (ref 150–400)
PMV BLD AUTO: 11.1 FL (ref 8.9–12.9)
POTASSIUM SERPL-SCNC: 4.4 MMOL/L (ref 3.5–5.1)
PROT SERPL-MCNC: 8.3 G/DL (ref 6.4–8.2)
Q-T INTERVAL, ECG07: 364 MS
QRS DURATION, ECG06: 78 MS
QTC CALCULATION (BEZET), ECG08: 406 MS
RBC # BLD AUTO: 5.25 M/UL (ref 4.1–5.7)
SODIUM SERPL-SCNC: 134 MMOL/L (ref 136–145)
TROPONIN-HIGH SENSITIVITY: 5 NG/L (ref 0–76)
TROPONIN-HIGH SENSITIVITY: 5 NG/L (ref 0–76)
VENTRICULAR RATE, ECG03: 75 BPM
WBC # BLD AUTO: 7.5 K/UL (ref 4.1–11.1)

## 2022-12-27 PROCEDURE — 99285 EMERGENCY DEPT VISIT HI MDM: CPT

## 2022-12-27 PROCEDURE — 74011250636 HC RX REV CODE- 250/636: Performed by: EMERGENCY MEDICINE

## 2022-12-27 PROCEDURE — 93005 ELECTROCARDIOGRAM TRACING: CPT

## 2022-12-27 PROCEDURE — 84484 ASSAY OF TROPONIN QUANT: CPT

## 2022-12-27 PROCEDURE — 85025 COMPLETE CBC W/AUTO DIFF WBC: CPT

## 2022-12-27 PROCEDURE — 71045 X-RAY EXAM CHEST 1 VIEW: CPT

## 2022-12-27 PROCEDURE — 80053 COMPREHEN METABOLIC PANEL: CPT

## 2022-12-27 PROCEDURE — 36415 COLL VENOUS BLD VENIPUNCTURE: CPT

## 2022-12-27 PROCEDURE — 96374 THER/PROPH/DIAG INJ IV PUSH: CPT

## 2022-12-27 RX ORDER — MORPHINE SULFATE 4 MG/ML
4 INJECTION INTRAVENOUS ONCE
Status: COMPLETED | OUTPATIENT
Start: 2022-12-27 | End: 2022-12-27

## 2022-12-27 RX ORDER — IBUPROFEN 800 MG/1
800 TABLET ORAL
Qty: 20 TABLET | Refills: 0 | Status: SHIPPED | OUTPATIENT
Start: 2022-12-27 | End: 2023-01-01

## 2022-12-27 RX ORDER — LIDOCAINE 50 MG/G
1 PATCH TOPICAL EVERY 24 HOURS
Qty: 7 PATCH | Refills: 0 | Status: SHIPPED | OUTPATIENT
Start: 2022-12-27 | End: 2023-01-03

## 2022-12-27 RX ADMIN — MORPHINE SULFATE 4 MG: 4 INJECTION, SOLUTION INTRAMUSCULAR; INTRAVENOUS at 13:45

## 2022-12-27 NOTE — DISCHARGE INSTRUCTIONS
Thank you! Thank you for allowing me to care for you in the emergency department. It is my goal to provide you with excellent care. If you have not received excellent quality care, please ask to speak to the nurse manager. Please fill out the survey that will come to you by mail or email since we listen to your feedback! Below you will find a list of your tests from today's visit. Should you have any questions, please do not hesitate to call the emergency department. Labs  Recent Results (from the past 12 hour(s))   EKG, 12 LEAD, INITIAL    Collection Time: 12/27/22  1:16 PM   Result Value Ref Range    Ventricular Rate 75 BPM    Atrial Rate 75 BPM    P-R Interval 180 ms    QRS Duration 78 ms    Q-T Interval 364 ms    QTC Calculation (Bezet) 406 ms    Calculated P Axis 54 degrees    Calculated R Axis -45 degrees    Calculated T Axis 47 degrees    Diagnosis       Normal sinus rhythm  Left axis deviation  Inferior infarct , age undetermined  Abnormal ECG  When compared with ECG of 21-JUN-2022 17:50,  QRS axis Shifted left  QRS voltage has decreased  Inferior infarct is now Present  Confirmed by Parminder Stratton ([id number]) on 12/27/2022 2:07:09 PM     CBC WITH AUTOMATED DIFF    Collection Time: 12/27/22  1:35 PM   Result Value Ref Range    WBC 7.5 4.1 - 11.1 K/uL    RBC 5.25 4. 10 - 5.70 M/uL    HGB 15.8 12.1 - 17.0 g/dL    HCT 48.2 36.6 - 50.3 %    MCV 91.8 80.0 - 99.0 FL    MCH 30.1 26.0 - 34.0 PG    MCHC 32.8 30.0 - 36.5 g/dL    RDW 12.0 11.5 - 14.5 %    PLATELET 220 (L) 808 - 400 K/uL    MPV 11.1 8.9 - 12.9 FL    NRBC 0.0 0.0  WBC    ABSOLUTE NRBC 0.00 0.00 - 0.01 K/uL    NEUTROPHILS 39 32 - 75 %    LYMPHOCYTES 40 12 - 49 %    MONOCYTES 12 5 - 13 %    EOSINOPHILS 8 (H) 0 - 7 %    BASOPHILS 1 0 - 1 %    IMMATURE GRANULOCYTES 0 0 - 0.5 %    ABS. NEUTROPHILS 2.9 1.8 - 8.0 K/UL    ABS. LYMPHOCYTES 3.0 0.8 - 3.5 K/UL    ABS. MONOCYTES 0.9 0.0 - 1.0 K/UL    ABS. EOSINOPHILS 0.6 (H) 0.0 - 0.4 K/UL    ABS. BASOPHILS 0.1 0.0 - 0.1 K/UL    ABS. IMM. GRANS. 0.0 0.00 - 0.04 K/UL    DF AUTOMATED     METABOLIC PANEL, COMPREHENSIVE    Collection Time: 12/27/22  1:35 PM   Result Value Ref Range    Sodium 134 (L) 136 - 145 mmol/L    Potassium 4.4 3.5 - 5.1 mmol/L    Chloride 103 97 - 108 mmol/L    CO2 24 21 - 32 mmol/L    Anion gap 7 5 - 15 mmol/L    Glucose 102 (H) 65 - 100 mg/dL    BUN 13 6 - 20 mg/dL    Creatinine 0.84 0.70 - 1.30 mg/dL    BUN/Creatinine ratio 15 12 - 20      eGFR >60 >60 ml/min/1.73m2    Calcium 9.4 8.5 - 10.1 mg/dL    Bilirubin, total 0.9 0.2 - 1.0 mg/dL    AST (SGOT) 19 15 - 37 U/L    ALT (SGPT) 41 12 - 78 U/L    Alk. phosphatase 100 45 - 117 U/L    Protein, total 8.3 (H) 6.4 - 8.2 g/dL    Albumin 3.9 3.5 - 5.0 g/dL    Globulin 4.4 (H) 2.0 - 4.0 g/dL    A-G Ratio 0.9 (L) 1.1 - 2.2     TROPONIN-HIGH SENSITIVITY    Collection Time: 12/27/22  1:35 PM   Result Value Ref Range    Troponin-High Sensitivity 5 0 - 76 ng/L   TROPONIN-HIGH SENSITIVITY    Collection Time: 12/27/22  3:41 PM   Result Value Ref Range    Troponin-High Sensitivity 5 0 - 76 ng/L       Radiologic Studies  XR CHEST PORT   Final Result   Hypoinflated, grossly clear lungs. CT Results  (Last 48 hours)      None          CXR Results  (Last 48 hours)                 12/27/22 1343  XR CHEST PORT Final result    Impression:  Hypoinflated, grossly clear lungs. Narrative:  INDICATION: Chest pain. Portable AP view of the chest.       Direct comparison made to prior chest x-ray dated 6/2022. Cardiomediastinal silhouette is stable. Lungs are hypoinflated, but grossly   clear bilaterally. Pleural spaces are normal and there is no pneumothorax.    Osseous structures are intact.                 ------------------------------------------------------------------------------------------------------------  The exam and treatment you received in the Emergency Department were for an urgent problem and are not intended as complete care. It is important that you follow-up with a doctor, nurse practitioner, or physician assistant to:  (1) confirm your diagnosis,  (2) re-evaluation of changes in your illness and treatment, and  (3) for ongoing care. Please take your discharge instructions with you when you go to your follow-up appointment. If you have any problem arranging a follow-up appointment, contact the Emergency Department. If your symptoms become worse or you do not improve as expected and you are unable to reach your health care provider, please return to the Emergency Department. We are available 24 hours a day. If a prescription has been provided, please have it filled as soon as possible to prevent a delay in treatment. If you have any questions or reservations about taking the medication due to side effects or interactions with other medications, please call your primary care provider or contact the ER.

## 2022-12-27 NOTE — ED PROVIDER NOTES
EMERGENCY DEPARTMENT HISTORY AND PHYSICAL EXAM      Date: 12/27/2022  Patient Name: Kristian Major    History of Presenting Illness     Chief Complaint   Patient presents with    Chest Pain       History Provided By: Patient and Patient's Wife    HPI: Kristian Major, 81 y.o. male history of hypertension presenting with right-sided chest pain. He states his first bout was in July. He has had intermittent occasional right-sided pain since then but this started yesterday. He states he was on a walk and by the end of the walk he was having severe right-sided pain that is worse with taking a deep breath then. He is also worse with movement. Patient does do a lot of heavy lifting but has not noticed anything worse than usual recently. No cough, shortness of breath, nausea, vomiting. There are no other complaints, changes, or physical findings at this time. Past History     Past Medical History:  Past Medical History:   Diagnosis Date    HTN (hypertension)     Migraines     Osteitis pubis ([facility] [address] 12/2/2015       Past Surgical History:  Past Surgical History:   Procedure Laterality Date    HX CARPAL TUNNEL RELEASE      HX CERVICAL FUSION      HX HERNIA REPAIR         Family History:  History reviewed. No pertinent family history. Social History:  Social History     Tobacco Use    Smoking status: Former    Smokeless tobacco: Never   Substance Use Topics    Alcohol use: Yes     Alcohol/week: 0.0 standard drinks     Comment: one per week       Allergies: Allergies   Allergen Reactions    Metoprolol Unknown (comments)    Niacin Rash    Oxycodone Other (comments)     Breathing difficulty    Robitussin [Guaifenesin] Other (comments)     Throat swelling       PCP: Jv Pandey, DO    No current facility-administered medications on file prior to encounter.      Current Outpatient Medications on File Prior to Encounter   Medication Sig Dispense Refill    azelastine (ASTEPRO) 205.5 mcg (0.15 %) 1 [place] by Both Nostrils route two (2) times a day.  losartan (COZAAR) 50 mg tablet 100 mg.  clobetasol (TEMOVATE) 0.05 % external solution       BIOFLAV,LEMON/VIT BCOMP&C (LIPO-FLAVONOID PLUS PO) Take  by mouth.  KRILL/OM-3/DHA/EPA/PHOSPHO/AST (MEGARED OMEGA-3 KRILL OIL PO) Take  by mouth.  TEA TREE OIL EX by Apply Externally route.  OTHER  for kidney stones         Review of Systems   Review of Systems   Constitutional:  Negative for chills and fever. HENT:  Negative for sore throat. Eyes:  Negative for redness. Respiratory:  Negative for shortness of breath. Cardiovascular:  Positive for chest pain. Gastrointestinal:  Negative for abdominal pain, nausea and vomiting. Genitourinary:  Negative for flank pain. Musculoskeletal:  Negative for myalgias. Skin:  Negative for rash. Neurological:  Negative for headaches. Physical Exam   Physical Exam  Vitals and nursing note reviewed. Constitutional:       General: He is not in acute distress. Appearance: Normal appearance. HENT:      Head: Normocephalic and atraumatic. Mouth/Throat:      Mouth: Mucous membranes are moist.   Eyes:      Extraocular Movements: Extraocular movements intact. Conjunctiva/sclera: Conjunctivae normal.   Cardiovascular:      Rate and Rhythm: Normal rate and regular rhythm. Pulmonary:      Effort: Pulmonary effort is normal. No respiratory distress. Breath sounds: Normal breath sounds. No wheezing, rhonchi or rales. Chest:      Chest wall: Tenderness (rigth sided point tenderness) present. Abdominal:      General: There is no distension. Palpations: Abdomen is soft. Tenderness: There is no abdominal tenderness. Musculoskeletal:         General: Normal range of motion. Cervical back: Normal range of motion. Skin:     General: Skin is warm and dry. Neurological:      General: No focal deficit present.       Mental Status: He is alert and oriented to person, place, and time. Mental status is at baseline. Lab and Diagnostic Study Results   Labs -     Recent Results (from the past 12 hour(s))   EKG, 12 LEAD, INITIAL    Collection Time: 12/27/22  1:16 PM   Result Value Ref Range    Ventricular Rate 75 BPM    Atrial Rate 75 BPM    P-R Interval 180 ms    QRS Duration 78 ms    Q-T Interval 364 ms    QTC Calculation (Bezet) 406 ms    Calculated P Axis 54 degrees    Calculated R Axis -45 degrees    Calculated T Axis 47 degrees    Diagnosis       Normal sinus rhythm  Left axis deviation  Inferior infarct , age undetermined  Abnormal ECG  When compared with ECG of 21-JUN-2022 17:50,  QRS axis Shifted left  QRS voltage has decreased  Inferior infarct is now Present  Confirmed by Ramsey Patterson ([id number]) on 12/27/2022 2:07:09 PM     CBC WITH AUTOMATED DIFF    Collection Time: 12/27/22  1:35 PM   Result Value Ref Range    WBC 7.5 4.1 - 11.1 K/uL    RBC 5.25 4. 10 - 5.70 M/uL    HGB 15.8 12.1 - 17.0 g/dL    HCT 48.2 36.6 - 50.3 %    MCV 91.8 80.0 - 99.0 FL    MCH 30.1 26.0 - 34.0 PG    MCHC 32.8 30.0 - 36.5 g/dL    RDW 12.0 11.5 - 14.5 %    PLATELET 506 (L) 840 - 400 K/uL    MPV 11.1 8.9 - 12.9 FL    NRBC 0.0 0.0  WBC    ABSOLUTE NRBC 0.00 0.00 - 0.01 K/uL    NEUTROPHILS 39 32 - 75 %    LYMPHOCYTES 40 12 - 49 %    MONOCYTES 12 5 - 13 %    EOSINOPHILS 8 (H) 0 - 7 %    BASOPHILS 1 0 - 1 %    IMMATURE GRANULOCYTES 0 0 - 0.5 %    ABS. NEUTROPHILS 2.9 1.8 - 8.0 K/UL    ABS. LYMPHOCYTES 3.0 0.8 - 3.5 K/UL    ABS. MONOCYTES 0.9 0.0 - 1.0 K/UL    ABS. EOSINOPHILS 0.6 (H) 0.0 - 0.4 K/UL    ABS. BASOPHILS 0.1 0.0 - 0.1 K/UL    ABS. IMM.  GRANS. 0.0 0.00 - 0.04 K/UL    DF AUTOMATED     METABOLIC PANEL, COMPREHENSIVE    Collection Time: 12/27/22  1:35 PM   Result Value Ref Range    Sodium 134 (L) 136 - 145 mmol/L    Potassium 4.4 3.5 - 5.1 mmol/L    Chloride 103 97 - 108 mmol/L    CO2 24 21 - 32 mmol/L    Anion gap 7 5 - 15 mmol/L    Glucose 102 (H) 65 - 100 mg/dL BUN 13 6 - 20 mg/dL    Creatinine 0.84 0.70 - 1.30 mg/dL    BUN/Creatinine ratio 15 12 - 20      eGFR >60 >60 ml/min/1.73m2    Calcium 9.4 8.5 - 10.1 mg/dL    Bilirubin, total 0.9 0.2 - 1.0 mg/dL    AST (SGOT) 19 15 - 37 U/L    ALT (SGPT) 41 12 - 78 U/L    Alk. phosphatase 100 45 - 117 U/L    Protein, total 8.3 (H) 6.4 - 8.2 g/dL    Albumin 3.9 3.5 - 5.0 g/dL    Globulin 4.4 (H) 2.0 - 4.0 g/dL    A-G Ratio 0.9 (L) 1.1 - 2.2     TROPONIN-HIGH SENSITIVITY    Collection Time: 12/27/22  1:35 PM   Result Value Ref Range    Troponin-High Sensitivity 5 0 - 76 ng/L   TROPONIN-HIGH SENSITIVITY    Collection Time: 12/27/22  3:41 PM   Result Value Ref Range    Troponin-High Sensitivity 5 0 - 76 ng/L       Radiologic Studies -   @[email or web address]@  CT Results  (Last 48 hours)      None          CXR Results  (Last 48 hours)                 12/27/22 1343  XR CHEST PORT Final result    Impression:  Hypoinflated, grossly clear lungs. Narrative:  INDICATION: Chest pain. Portable AP view of the chest.       Direct comparison made to prior chest x-ray dated 6/2022. Cardiomediastinal silhouette is stable. Lungs are hypoinflated, but grossly   clear bilaterally. Pleural spaces are normal and there is no pneumothorax. Osseous structures are intact. Medical Decision Making and ED Course   Differential Diagnosis & Medical Decision Making Provider Note:   Patient presents with CP. While the spectrum of DDx includes ACS, Aortic dissection, PNA, PE, PTX, pericarditis, myocarditis, GERD, costochondritis, anxiety, most concerned for acs, musculoskeletal. given the HPI and Physical exam.  The others are less likely. Will obtain labs, CXR, EKG. Work-up is reassuring with negative troponin, clear chest x-ray, normal EKG. Suspect musculoskeletal etiology as pain is reproducible on palpation and worse with movement. This was discussed with patient.   Will prescribe lidocaine patches and recommended ibuprofen and Tylenol for pain. Recommended follow-up with PCP for reevaluation.        - I am the first provider for this patient. I reviewed the vital signs, available nursing notes, past medical history, past surgical history, family history and social history. The patients presenting problems have been discussed, and they are in agreement with the care plan formulated and outlined with them. I have encouraged them to ask questions as they arise throughout their visit. Vital Signs-Reviewed the patient's vital signs. Patient Vitals for the past 12 hrs:   Temp Pulse Resp BP SpO2   12/27/22 1552 -- 63 14 -- 97 %   12/27/22 1547 -- 66 16 -- 96 %   12/27/22 1537 -- 64 16 -- 98 %   12/27/22 1532 -- 62 13 (!) 140/80 94 %   12/27/22 1512 -- 64 12 -- 95 %   12/27/22 1502 -- 64 14 137/75 99 %   12/27/22 1442 -- 63 11 (!) 142/85 100 %   12/27/22 1353 -- 69 17 -- 99 %   12/27/22 1338 -- 82 18 (!) 140/91 --   12/27/22 1328 -- 79 16 -- 100 %   12/27/22 1323 -- 78 15 (!) 160/86 100 %   12/27/22 1317 -- -- -- -- 98 %   12/27/22 1313 97.6 °F (36.4 °C) 80 14 (!) 155/84 100 %       ED Course:   ED Course as of 12/27/22 2015   Tue Dec 27, 2022   1329 ECG interpreted by me  Time:1319  NSR, rate 75, normal axis, normal intervals, no ST elevation/depression, no TWI, QTc 406   [KK]      ED Course User Index  [KK] Kimmie Sosa MD            Disposition   Disposition: DC- Adult Discharges: All of the diagnostic tests were reviewed and questions answered. Diagnosis, care plan and treatment options were discussed. The patient understands the instructions and will follow up as directed. The patients results have been reviewed with them. They have been counseled regarding their diagnosis. The patient verbally convey understanding and agreement of the signs, symptoms, diagnosis, treatment and prognosis and additionally agrees to follow up as recommended with their PCP in 24 - 48 hours.   They also agree with the care-plan and convey that all of their questions have been answered. I have also put together some discharge instructions for them that include: 1) educational information regarding their diagnosis, 2) how to care for their diagnosis at home, as well a 3) list of reasons why they would want to return to the ED prior to their follow-up appointment, should their condition change. DISCHARGE PLAN:  1. Current Discharge Medication List        CONTINUE these medications which have NOT CHANGED    Details   azelastine (ASTEPRO) 205.5 mcg (0.15 %) 1 [place] by Both Nostrils route two (2) times a day. losartan (COZAAR) 50 mg tablet 100 mg.      clobetasol (TEMOVATE) 0.05 % external solution       BIOFLAV,LEMON/VIT BCOMP&C (LIPO-FLAVONOID PLUS PO) Take  by mouth. KRILL/OM-3/DHA/EPA/PHOSPHO/AST (MEGARED OMEGA-3 KRILL OIL PO) Take  by mouth. TEA TREE OIL EX by Apply Externally route. OTHER  for kidney stones           2. Follow-up Information       Follow up With Specialties Details Why Contact Jenna Hernadez DO Family Medicine  for reevaluation [address] [place] [address]  [phone]            3. Return to ED if worse   4. Discharge Medication List as of 12/27/2022  5:04 PM        START taking these medications    Details   lidocaine (Lidoderm) 5 % 1 Patch by TransDERmal route every twenty-four (24) hours for 7 days. Apply patch to the affected area for 12 hours a day and remove for 12 hours a day., Normal, Disp-7 Patch, R-0      ibuprofen (MOTRIN) 800 mg tablet Take 1 Tablet by mouth every eight (8) hours as needed for Pain for up to 7 days. , Normal, Disp-20 Tablet, R-0           CONTINUE these medications which have NOT CHANGED    Details   azelastine (ASTEPRO) 205.5 mcg (0.15 %) 1 [place] by Both Nostrils route two (2) times a day., Historical Med      losartan (COZAAR) 50 mg tablet 100 mg., Historical Med      clobetasol (TEMOVATE) 0.05 % external solution Historical Med      BIOFLAV,LEMON/VIT BCOMP&C (LIPO-FLAVONOID PLUS PO) Take  by mouth., Historical Med      KRILL/OM-3/DHA/EPA/PHOSPHO/AST (MEGARED OMEGA-3 KRILL OIL PO) Take  by mouth., Historical Med      TEA TREE OIL EX by Apply Externally route., Historical Med      OTHER  for kidney stones, Historical Med               Diagnosis/Clinical Impression     Clinical Impression:   1. Chest pain, unspecified type        Attestations: Ciro Rome MD, am the primary clinician of record. Please note that this dictation was completed with [employer], the [employer] voice recognition software. Quite often unanticipated grammatical, syntax, homophones, and other interpretive errors are inadvertently transcribed by the computer software. Please disregard these errors. Please excuse any errors that have escaped final proofreading. Thank you.

## 2022-12-27 NOTE — ED NOTES
Pt understanding of dc instructions, medications and followup care.  Pt a&ox4 and ambulatory at discharge

## 2023-01-01 ENCOUNTER — APPOINTMENT (OUTPATIENT)
Dept: GENERAL RADIOLOGY | Age: 73
End: 2023-01-01
Attending: EMERGENCY MEDICINE
Payer: MEDICARE

## 2023-01-01 ENCOUNTER — HOSPITAL ENCOUNTER (EMERGENCY)
Age: 73
Discharge: HOME | End: 2023-01-01
Attending: EMERGENCY MEDICINE
Payer: MEDICARE

## 2023-01-01 VITALS
SYSTOLIC BLOOD PRESSURE: 144 MMHG | WEIGHT: 196 LBS | HEIGHT: 73 IN | BODY MASS INDEX: 25.98 KG/M2 | HEART RATE: 93 BPM | DIASTOLIC BLOOD PRESSURE: 69 MMHG | OXYGEN SATURATION: 94 % | RESPIRATION RATE: 20 BRPM | TEMPERATURE: 97.5 F

## 2023-01-01 DIAGNOSIS — T78.40XA ALLERGIC REACTION, INITIAL ENCOUNTER: Primary | ICD-10-CM

## 2023-01-01 LAB
ALBUMIN SERPL-MCNC: 3.9 G/DL (ref 3.5–5)
ALBUMIN/GLOB SERPL: 1 {RATIO} (ref 1.1–2.2)
ALP SERPL-CCNC: 96 U/L (ref 45–117)
ALT SERPL-CCNC: 38 U/L (ref 12–78)
ANION GAP SERPL CALC-SCNC: 7 MMOL/L (ref 5–15)
AST SERPL W P-5'-P-CCNC: 20 U/L (ref 15–37)
BASOPHILS # BLD: 0.1 K/UL (ref 0–0.1)
BASOPHILS NFR BLD: 1 % (ref 0–1)
BILIRUB SERPL-MCNC: 0.5 MG/DL (ref 0.2–1)
BUN SERPL-MCNC: 15 MG/DL (ref 6–20)
BUN/CREAT SERPL: 15 (ref 12–20)
CA-I BLD-MCNC: 9.2 MG/DL (ref 8.5–10.1)
CHLORIDE SERPL-SCNC: 102 MMOL/L (ref 97–108)
CO2 SERPL-SCNC: 28 MMOL/L (ref 21–32)
CREAT SERPL-MCNC: 0.98 MG/DL (ref 0.7–1.3)
DIFFERENTIAL METHOD BLD: ABNORMAL
EOSINOPHIL # BLD: 0.6 K/UL (ref 0–0.4)
EOSINOPHIL NFR BLD: 9 % (ref 0–7)
ERYTHROCYTE [DISTWIDTH] IN BLOOD BY AUTOMATED COUNT: 12 % (ref 11.5–14.5)
GLOBULIN SER CALC-MCNC: 3.8 G/DL (ref 2–4)
GLUCOSE SERPL-MCNC: 154 MG/DL (ref 65–100)
HCT VFR BLD AUTO: 44.2 % (ref 36.6–50.3)
HGB BLD-MCNC: 14.5 G/DL (ref 12.1–17)
IMM GRANULOCYTES # BLD AUTO: 0 K/UL (ref 0–0.04)
IMM GRANULOCYTES NFR BLD AUTO: 0 % (ref 0–0.5)
LYMPHOCYTES # BLD: 2.5 K/UL (ref 0.8–3.5)
LYMPHOCYTES NFR BLD: 35 % (ref 12–49)
MCH RBC QN AUTO: 29.8 PG (ref 26–34)
MCHC RBC AUTO-ENTMCNC: 32.8 G/DL (ref 30–36.5)
MCV RBC AUTO: 90.9 FL (ref 80–99)
MONOCYTES # BLD: 0.9 K/UL (ref 0–1)
MONOCYTES NFR BLD: 13 % (ref 5–13)
NEUTS SEG # BLD: 3.1 K/UL (ref 1.8–8)
NEUTS SEG NFR BLD: 42 % (ref 32–75)
NRBC # BLD: 0 K/UL (ref 0–0.01)
NRBC BLD-RTO: 0 PER 100 WBC
PLATELET # BLD AUTO: 154 K/UL (ref 150–400)
PMV BLD AUTO: 10.9 FL (ref 8.9–12.9)
POTASSIUM SERPL-SCNC: 4.3 MMOL/L (ref 3.5–5.1)
PROT SERPL-MCNC: 7.7 G/DL (ref 6.4–8.2)
RBC # BLD AUTO: 4.86 M/UL (ref 4.1–5.7)
SODIUM SERPL-SCNC: 137 MMOL/L (ref 136–145)
TROPONIN-HIGH SENSITIVITY: 6 NG/L (ref 0–76)
WBC # BLD AUTO: 7.2 K/UL (ref 4.1–11.1)

## 2023-01-01 PROCEDURE — 99285 EMERGENCY DEPT VISIT HI MDM: CPT

## 2023-01-01 PROCEDURE — 74011250636 HC RX REV CODE- 250/636: Performed by: EMERGENCY MEDICINE

## 2023-01-01 PROCEDURE — 93005 ELECTROCARDIOGRAM TRACING: CPT

## 2023-01-01 PROCEDURE — 96375 TX/PRO/DX INJ NEW DRUG ADDON: CPT

## 2023-01-01 PROCEDURE — 96374 THER/PROPH/DIAG INJ IV PUSH: CPT

## 2023-01-01 PROCEDURE — 85025 COMPLETE CBC W/AUTO DIFF WBC: CPT

## 2023-01-01 PROCEDURE — 71045 X-RAY EXAM CHEST 1 VIEW: CPT

## 2023-01-01 PROCEDURE — 80053 COMPREHEN METABOLIC PANEL: CPT

## 2023-01-01 PROCEDURE — 36415 COLL VENOUS BLD VENIPUNCTURE: CPT

## 2023-01-01 PROCEDURE — 94640 AIRWAY INHALATION TREATMENT: CPT

## 2023-01-01 PROCEDURE — 74011000250 HC RX REV CODE- 250: Performed by: EMERGENCY MEDICINE

## 2023-01-01 PROCEDURE — 84484 ASSAY OF TROPONIN QUANT: CPT

## 2023-01-01 RX ORDER — IPRATROPIUM BROMIDE AND ALBUTEROL SULFATE 2.5; .5 MG/3ML; MG/3ML
3 SOLUTION RESPIRATORY (INHALATION)
Status: COMPLETED | OUTPATIENT
Start: 2023-01-01 | End: 2023-01-01

## 2023-01-01 RX ORDER — DIPHENHYDRAMINE HYDROCHLORIDE 50 MG/ML
50 INJECTION, SOLUTION INTRAMUSCULAR; INTRAVENOUS
Status: COMPLETED | OUTPATIENT
Start: 2023-01-01 | End: 2023-01-01

## 2023-01-01 RX ORDER — ALBUTEROL SULFATE 90 UG/1
2 AEROSOL, METERED RESPIRATORY (INHALATION)
COMMUNITY

## 2023-01-01 RX ORDER — CARVEDILOL 3.12 MG/1
TABLET ORAL
COMMUNITY
Start: 2022-10-11

## 2023-01-01 RX ORDER — PREDNISONE 20 MG/1
20 TABLET ORAL DAILY
Qty: 10 TABLET | Refills: 0 | Status: SHIPPED | OUTPATIENT
Start: 2023-01-01 | End: 2023-01-11

## 2023-01-01 RX ADMIN — IPRATROPIUM BROMIDE AND ALBUTEROL SULFATE 3 ML: 2.5; .5 SOLUTION RESPIRATORY (INHALATION) at 19:47

## 2023-01-01 RX ADMIN — IPRATROPIUM BROMIDE AND ALBUTEROL SULFATE 3 ML: 2.5; .5 SOLUTION RESPIRATORY (INHALATION) at 19:45

## 2023-01-01 RX ADMIN — DIPHENHYDRAMINE HYDROCHLORIDE 50 MG: 50 INJECTION INTRAMUSCULAR; INTRAVENOUS at 19:29

## 2023-01-01 RX ADMIN — METHYLPREDNISOLONE SODIUM SUCCINATE 125 MG: 125 INJECTION, POWDER, FOR SOLUTION INTRAMUSCULAR; INTRAVENOUS at 19:29

## 2023-01-01 RX ADMIN — IPRATROPIUM BROMIDE AND ALBUTEROL SULFATE 3 ML: 2.5; .5 SOLUTION RESPIRATORY (INHALATION) at 19:30

## 2023-01-02 NOTE — ED PROVIDER NOTES
EMERGENCY DEPARTMENT HISTORY AND PHYSICAL EXAM      Date: 1/1/2023  Patient Name: Ervin Boas    History of Presenting Illness     Chief Complaint   Patient presents with    Chest Pain       History Provided By: Patient    HPI: Ervin Boas, 81 y.o. male medical history significant for coronary disease and asthma presents with a 2-day history of progressively worsening shortness of breath. He also denotes multiple allergies but denies any contact with those. Patient says has been increased shortness of breath over the past few hours . Relief self with albuterol at home without relief of his symptoms  There are no other complaints, changes, or physical findings at this time. Relates that he thinks his esophagus is closing up    Past History   Past Medical History:  Past Medical History:   Diagnosis Date    Asthma     CAD (coronary artery disease)     Fatty liver     HTN (hypertension)     Migraines     Osteitis pubis ([facility] [address] 12/02/2015       Past Surgical History:  Past Surgical History:   Procedure Laterality Date    HX CARPAL TUNNEL RELEASE      HX CERVICAL FUSION      HX HERNIA REPAIR         Family History:  History reviewed. No pertinent family history. Social History:  Social History     Tobacco Use    Smoking status: Former    Smokeless tobacco: Never   Substance Use Topics    Alcohol use: Yes     Alcohol/week: 0.0 standard drinks     Comment: one per week       Allergies: Allergies   Allergen Reactions    Metoprolol Unknown (comments)    Niacin Rash    Oxycodone Other (comments)     Breathing difficulty    Robitussin [Guaifenesin] Other (comments)     Throat swelling       PCP: Edward King, DO    Current Outpatient Medications   Medication Sig Dispense Refill    carvediloL (COREG) 3.125 mg tablet       albuterol (PROVENTIL HFA, VENTOLIN HFA, PROAIR HFA) 90 mcg/actuation inhaler Take 2 Puffs by inhalation every four (4) hours as needed for Wheezing.       predniSONE (DELTASONE) 20 mg tablet Take 1 Tablet by mouth daily for 10 days. With Breakfast 10 Tablet 0    lidocaine (Lidoderm) 5 % 1 Patch by TransDERmal route every twenty-four (24) hours for 7 days. Apply patch to the affected area for 12 hours a day and remove for 12 hours a day. 7 Patch 0    losartan (COZAAR) 50 mg tablet 100 mg. BIOFLAV,LEMON/VIT BCOMP&C (LIPO-FLAVONOID PLUS PO) Take  by mouth. KRILL/OM-3/DHA/EPA/PHOSPHO/AST (MEGARED OMEGA-3 KRILL OIL PO) Take  by mouth. Review of Systems   Review of Systems   Respiratory:  Positive for shortness of breath. Negative for apnea, cough, choking, wheezing and stridor. Cardiovascular:  Negative for chest pain. All other systems reviewed and are negative. Physical Exam   Physical Exam  Vitals and nursing note reviewed. Constitutional:       General: He is in acute distress. Appearance: He is well-developed. HENT:      Head: Normocephalic and atraumatic. Nose: Nose normal.      Mouth/Throat:      Mouth: Mucous membranes are moist.      Pharynx: Oropharynx is clear. No oropharyngeal exudate. Eyes:      General:         Right eye: No discharge. Left eye: No discharge. Conjunctiva/sclera: Conjunctivae normal.      Pupils: Pupils are equal, round, and reactive to light. Comments: Does have some mild edema to the face and eyes   Cardiovascular:      Rate and Rhythm: Normal rate and regular rhythm. Chest Wall: PMI is not displaced. No thrill. Heart sounds: Normal heart sounds. No murmur heard. No friction rub. No gallop. Pulmonary:      Effort: Pulmonary effort is normal. Tachypnea present. No respiratory distress. Breath sounds: Normal breath sounds. No wheezing or rales. Chest:      Chest wall: No tenderness. Abdominal:      General: Bowel sounds are normal. There is no distension. Palpations: Abdomen is soft. There is no mass. Tenderness: There is no abdominal tenderness. There is no guarding or rebound. Musculoskeletal:         General: Normal range of motion. Cervical back: Normal range of motion and neck supple. Lymphadenopathy:      Cervical: No cervical adenopathy. Skin:     General: Skin is warm and dry. Capillary Refill: Capillary refill takes less than 2 seconds. Findings: No erythema or rash. Neurological:      Mental Status: He is alert and oriented to person, place, and time. Cranial Nerves: No cranial nerve deficit. Coordination: Coordination normal.   Psychiatric:         Mood and Affect: Mood normal.         Behavior: Behavior normal.       Lab and Diagnostic Study Results   Labs -     Recent Results (from the past 12 hour(s))   CBC WITH AUTOMATED DIFF    Collection Time: 01/01/23  7:15 PM   Result Value Ref Range    WBC 7.2 4.1 - 11.1 K/uL    RBC 4.86 4.10 - 5.70 M/uL    HGB 14.5 12.1 - 17.0 g/dL    HCT 44.2 36.6 - 50.3 %    MCV 90.9 80.0 - 99.0 FL    MCH 29.8 26.0 - 34.0 PG    MCHC 32.8 30.0 - 36.5 g/dL    RDW 12.0 11.5 - 14.5 %    PLATELET 177 813 - 436 K/uL    MPV 10.9 8.9 - 12.9 FL    NRBC 0.0 0.0  WBC    ABSOLUTE NRBC 0.00 0.00 - 0.01 K/uL    NEUTROPHILS 42 32 - 75 %    LYMPHOCYTES 35 12 - 49 %    MONOCYTES 13 5 - 13 %    EOSINOPHILS 9 (H) 0 - 7 %    BASOPHILS 1 0 - 1 %    IMMATURE GRANULOCYTES 0 0 - 0.5 %    ABS. NEUTROPHILS 3.1 1.8 - 8.0 K/UL    ABS. LYMPHOCYTES 2.5 0.8 - 3.5 K/UL    ABS. MONOCYTES 0.9 0.0 - 1.0 K/UL    ABS. EOSINOPHILS 0.6 (H) 0.0 - 0.4 K/UL    ABS. BASOPHILS 0.1 0.0 - 0.1 K/UL    ABS. IMM.  GRANS. 0.0 0.00 - 0.04 K/UL    DF AUTOMATED     METABOLIC PANEL, COMPREHENSIVE    Collection Time: 01/01/23  7:15 PM   Result Value Ref Range    Sodium 137 136 - 145 mmol/L    Potassium 4.3 3.5 - 5.1 mmol/L    Chloride 102 97 - 108 mmol/L    CO2 28 21 - 32 mmol/L    Anion gap 7 5 - 15 mmol/L    Glucose 154 (H) 65 - 100 mg/dL    BUN 15 6 - 20 mg/dL    Creatinine 0.98 0.70 - 1.30 mg/dL    BUN/Creatinine ratio 15 12 - 20      eGFR >60 >60 ml/min/1.73m2 Calcium 9.2 8.5 - 10.1 mg/dL    Bilirubin, total 0.5 0.2 - 1.0 mg/dL    AST (SGOT) 20 15 - 37 U/L    ALT (SGPT) 38 12 - 78 U/L    Alk. phosphatase 96 45 - 117 U/L    Protein, total 7.7 6.4 - 8.2 g/dL    Albumin 3.9 3.5 - 5.0 g/dL    Globulin 3.8 2.0 - 4.0 g/dL    A-G Ratio 1.0 (L) 1.1 - 2.2     TROPONIN-HIGH SENSITIVITY    Collection Time: 01/01/23  7:15 PM   Result Value Ref Range    Troponin-High Sensitivity 6 0 - 76 ng/L       Radiologic Studies -   [unfilled]  CT Results  (Last 48 hours)      None          CXR Results  (Last 48 hours)                 01/01/23 1920  XR CHEST PORT Final result    Impression:      No acute process on portable chest.           Narrative:  EXAM:  XR CHEST PORT       INDICATION: Chest pain       COMPARISON: 12/27/2022       TECHNIQUE: portable chest AP view obtained portably at 1913 hours       FINDINGS: The cardiac silhouette is within normal limits. The pulmonary   vasculature is within normal limits. The lungs and pleural spaces are clear. The visualized bones and upper abdomen   are age-appropriate. Medical Decision Making and ED Course   Differential Diagnosis & Medical Decision Making Provider Note:   82-year-old male with history of coronary disease presents with shortness of breath that he conveys that he believes is secondary to asthma. Patient does have significant allergies he does have some facial swelling and sore differential not only includes ACS, arrhythmia, asthma, pneumonia but more over also allergic reaction and anaphylactic reaction. Will assess with basic and cardiac labs EKG chest x-ray and will treat with steroids and Benadryl and albuterol and Atrovent at this time    - I am the first and primary provider for this patient. I reviewed the vital signs, available nursing notes, past medical history, past surgical history, family history and social history.  The patient's presenting problems have been discussed, and the staff are in agreement with the care plan formulated and outlined with them. I have encouraged them to ask questions as they arise throughout their visit. Vital Signs-Reviewed the patient's vital signs. Patient Vitals for the past 12 hrs:   Temp Pulse Resp BP SpO2   01/01/23 2043 -- 93 20 (!) 144/69 94 %   01/01/23 2013 -- 83 15 139/71 95 %   01/01/23 1947 -- -- -- -- 100 %   01/01/23 1946 -- -- -- -- 100 %   01/01/23 1913 97.5 °F (36.4 °C) 90 20 (!) 170/89 100 %       My independent EKG interpretation: (Preliminary): EKG Interpreted by me. Shows ventricular rate of 91 bpm,  ms, QRS duration 84 ms,  ms per interpretation: Normal sinus rhythm with inferior infarct age undetermined. Abnormal EKG. ED Course:   Patient symptoms of swelling and redness on his face have is resolved as well as the shortness of breath. Chest x-ray is clear and his labs are within normal limits. I do not appreciate that he has had an acute coronary event but more likely, and incongruence with his wife, patient's had an allergic reaction given his extensive allergies. Long discussion with regard to allergy avoidance as well as the use of an EpiPen at home should he need in the future. He says he has 1 of these and does not need one at this time. Procedures and Critical Care     Disposition   Disposition: Condition stable    DISCHARGE PLAN:  1. Current Discharge Medication List        CONTINUE these medications which have NOT CHANGED    Details   carvediloL (COREG) 3.125 mg tablet       albuterol (PROVENTIL HFA, VENTOLIN HFA, PROAIR HFA) 90 mcg/actuation inhaler Take 2 Puffs by inhalation every four (4) hours as needed for Wheezing. lidocaine (Lidoderm) 5 % 1 Patch by TransDERmal route every twenty-four (24) hours for 7 days. Apply patch to the affected area for 12 hours a day and remove for 12 hours a day. Qty: 7 Patch, Refills: 0      losartan (COZAAR) 50 mg tablet 100 mg.       BIOFLAV,LEMON/VIT BCOMP&C (LIPO-FLAVONOID PLUS PO) Take  by mouth. KRILL/OM-3/DHA/EPA/PHOSPHO/AST (MEGARED OMEGA-3 KRILL OIL PO) Take  by mouth. 2.   Follow-up Information       Follow up With Specialties Details Why Contact Jenna Franco DO Family Medicine Call in 2 days  [address]  [phone]            3. Return to ED if worse   4. Current Discharge Medication List        START taking these medications    Details   predniSONE (DELTASONE) 20 mg tablet Take 1 Tablet by mouth daily for 10 days. With Breakfast  Qty: 10 Tablet, Refills: 0  Start date: 1/1/2023, End date: 1/11/2023           Remove if admitted/transferred    Diagnosis/Clinical Impression     Clinical Impression:   1. Allergic reaction, initial encounter        Attestations: Jonathan Patterson MD, am the primary clinician of record. Please note that this dictation was completed with [employer], the [employer] voice recognition software. Quite often unanticipated grammatical, syntax, homophones, and other interpretive errors are inadvertently transcribed by the computer software. Please disregard these errors. Please excuse any errors that have escaped final proofreading. Thank you.

## 2023-01-02 NOTE — ED NOTES
2052 - Patient discharged by Orquidea Valero MD - pt sent to the front lobby, with strong and steady gait, no acute distress noted at time of discharge -  Discharge information / home RX / and reasons to return to the ED were reviewed by the ED provider.       Spouse at side for comfort care and for a ride home;;

## 2023-01-03 LAB
ATRIAL RATE: 91 BPM
CALCULATED P AXIS, ECG09: 65 DEGREES
CALCULATED R AXIS, ECG10: 0 DEGREES
CALCULATED T AXIS, ECG11: 54 DEGREES
DIAGNOSIS, 93000: NORMAL
P-R INTERVAL, ECG05: 176 MS
Q-T INTERVAL, ECG07: 346 MS
QRS DURATION, ECG06: 84 MS
QTC CALCULATION (BEZET), ECG08: 425 MS
VENTRICULAR RATE, ECG03: 91 BPM

## 2023-02-17 ENCOUNTER — OFFICE VISIT (OUTPATIENT)
Dept: ENT CLINIC | Age: 73
End: 2023-02-17
Payer: MEDICARE

## 2023-02-17 VITALS
SYSTOLIC BLOOD PRESSURE: 138 MMHG | RESPIRATION RATE: 19 BRPM | OXYGEN SATURATION: 99 % | DIASTOLIC BLOOD PRESSURE: 80 MMHG | HEART RATE: 73 BPM | BODY MASS INDEX: 25.98 KG/M2 | WEIGHT: 196 LBS | HEIGHT: 73 IN

## 2023-02-17 DIAGNOSIS — H69.81 EUSTACHIAN TUBE DYSFUNCTION, RIGHT: Primary | ICD-10-CM

## 2023-02-17 RX ORDER — HYDROXYZINE 25 MG/1
TABLET, FILM COATED ORAL
COMMUNITY

## 2023-02-17 RX ORDER — AMLODIPINE BESYLATE 5 MG/1
1 TABLET ORAL DAILY
COMMUNITY
Start: 2020-07-17

## 2023-02-17 RX ORDER — SUMATRIPTAN 100 MG/1
100 TABLET, FILM COATED ORAL
COMMUNITY

## 2023-02-17 RX ORDER — METHYLPREDNISOLONE 4 MG/1
4 TABLET ORAL
Qty: 1 DOSE PACK | Refills: 0 | Status: SHIPPED | OUTPATIENT
Start: 2023-02-17

## 2023-02-17 RX ORDER — GLUCOSAMINE SULFATE 1500 MG
POWDER IN PACKET (EA) ORAL DAILY
COMMUNITY

## 2023-02-17 RX ORDER — EPINEPHRINE 0.3 MG/.3ML
INJECTION SUBCUTANEOUS
COMMUNITY
Start: 2022-07-28

## 2023-02-17 RX ORDER — CIPROFLOXACIN AND DEXAMETHASONE 3; 1 MG/ML; MG/ML
4 SUSPENSION/ DROPS AURICULAR (OTIC) 2 TIMES DAILY
Qty: 7.5 ML | Refills: 0 | Status: SHIPPED | OUTPATIENT
Start: 2023-02-17 | End: 2023-02-27

## 2023-02-17 NOTE — PROGRESS NOTES
Chief Complaint   Patient presents with    New Patient     Hearing loss right ear / started after seeing cardiology (JAN 20th) and being put on new medication for blood flow in feet   Stopped the medication on 1/24/2023         Ringing in Ear     Visit Vitals  /80 (BP 1 Location: Right upper arm, BP Patient Position: Sitting, BP Cuff Size: Adult)   Pulse 73   Resp 19   Ht 6' 1\" (1.854 m)   Wt 196 lb (88.9 kg)   SpO2 99%   BMI 25.86 kg/m²

## 2023-02-17 NOTE — PROGRESS NOTES
Subjective: Debbie Ruiz   81 y.o.   1950     New Patient Visit  This is a 81 y.o. male who is here for sudden right sided hearing loss. He states his hearing suddenly went out on the right side. He began a new medication on 1/20/2023 and his hearing stopped 4 days later. He was on cilostazol 50 mg, he stopped the medication and called his cardiologist, he saw his PCP who put him amoxicillin which did not help with this issues. Denies otalgia or drainage from ear. He endorses a feeling of fullness, and states if he eats on that side, it will come back for a second. 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 Review of Systems  Review of Systems   Constitutional: Negative. HENT:  Positive for hearing loss. Negative for congestion, ear pain, sinus pain, sore throat and tinnitus. Respiratory:  Negative for stridor. Skin: Negative. All other systems reviewed and are negative. Past Medical History:   Diagnosis Date    Asthma     CAD (coronary artery disease)     Fatty liver     HTN (hypertension)     Migraines     Osteitis pubis ([facility] [address] 12/02/2015     Past Surgical History:   Procedure Laterality Date    HX CARPAL TUNNEL RELEASE      HX CERVICAL FUSION      HX HERNIA REPAIR        History reviewed. No pertinent family history. Social History     Tobacco Use    Smoking status: Former    Smokeless tobacco: Never   Substance Use Topics    Alcohol use: Yes     Alcohol/week: 0.0 standard drinks     Comment: one per week      Prior to Admission medications    Medication Sig Start Date End Date Taking? Authorizing Provider   amLODIPine (NORVASC) 5 mg tablet 1 Tablet daily. 7/17/20  Yes Provider, Historical   EPINEPHrine (EPIPEN) 0.3 mg/0.3 mL injection  7/28/22  Yes Provider, Historical   cholecalciferol (Vitamin D3) 25 mcg (1,000 unit) cap Take  by mouth daily.    Yes Provider, Historical   hydrOXYzine HCL (ATARAX) 25 mg tablet Take  by mouth three (3) times daily as needed. Yes Provider, Historical   SUMAtriptan (IMITREX) 100 mg tablet Take 100 mg by mouth once as needed for Migraine. Yes Provider, Historical   carvediloL (COREG) 3.125 mg tablet  10/11/22  Yes Other, MD Kamari   albuterol (PROVENTIL HFA, VENTOLIN HFA, PROAIR HFA) 90 mcg/actuation inhaler Take 2 Puffs by inhalation every four (4) hours as needed for Wheezing. Yes Other, MD Kamari   BIOFLAV,LEMON/VIT BCOMP&C (LIPO-FLAVONOID PLUS PO) Take  by mouth. Yes Provider, Historical   KRILL/OM-3/DHA/EPA/PHOSPHO/AST (MEGARED OMEGA-3 KRILL OIL PO) Take  by mouth. Yes Provider, Historical   losartan (COZAAR) 50 mg tablet 100 mg. Patient not taking: Reported on 2/17/2023 11/23/15   Provider, Historical        Allergies   Allergen Reactions    Metoprolol Unknown (comments)    Niacin Rash    Oxycodone Other (comments)     Breathing difficulty    Robitussin [Guaifenesin] Other (comments)     Throat swelling         Objective:     Visit Vitals  /80 (BP 1 Location: Right upper arm, BP Patient Position: Sitting, BP Cuff Size: Adult)   Pulse 73   Resp 19   Ht 6' 1\" (1.854 m)   Wt 196 lb (88.9 kg)   SpO2 99%   BMI 25.86 kg/m²        Physical Exam  Constitutional:       General: He is not in acute distress. Appearance: Normal appearance. He is normal weight. He is not ill-appearing, toxic-appearing or diaphoretic. HENT:      Head: Normocephalic and atraumatic. Right Ear: Ear canal and external ear normal. There is no impacted cerumen. Left Ear: Tympanic membrane, ear canal and external ear normal. There is no impacted cerumen. Ears:      Comments: Middle ear effusion right      Mouth/Throat:      Mouth: Mucous membranes are moist.      Pharynx: Oropharynx is clear. No oropharyngeal exudate or posterior oropharyngeal erythema. Eyes:      General: No scleral icterus. Right eye: No discharge. Left eye: No discharge.       Extraocular Movements: Extraocular movements intact. Conjunctiva/sclera: Conjunctivae normal.      Pupils: Pupils are equal, round, and reactive to light. Cardiovascular:      Rate and Rhythm: Normal rate and regular rhythm. Pulses: Normal pulses. Heart sounds: Normal heart sounds. Pulmonary:      Effort: Pulmonary effort is normal.      Breath sounds: Normal breath sounds. Abdominal:      General: Abdomen is flat. Palpations: Abdomen is soft. Musculoskeletal:         General: Normal range of motion. Cervical back: Normal range of motion and neck supple. Skin:     General: Skin is warm and dry. Capillary Refill: Capillary refill takes less than 2 seconds. Neurological:      General: No focal deficit present. Mental Status: He is alert and oriented to person, place, and time. Mental status is at baseline. Psychiatric:         Mood and Affect: Mood normal.         Behavior: Behavior normal.         Thought Content: Thought content normal.       Assessment/Plan:     Encounter Diagnoses   Name Primary?     Eustachian tube dysfunction, right Yes     Orders Placed This Encounter    amLODIPine (NORVASC) 5 mg tablet    EPINEPHrine (EPIPEN) 0.3 mg/0.3 mL injection    cholecalciferol (Vitamin D3) 25 mcg (1,000 unit) cap    hydrOXYzine HCL (ATARAX) 25 mg tablet    SUMAtriptan (IMITREX) 100 mg tablet     Eustachian tube dysfunction  Medrol dose pack   Follow-up in three weeks for possible myringotomy     Thank you for referring this patient,    Manas Boudreaux [facility]     [address]  [place]  [phone]

## 2023-02-20 DIAGNOSIS — H69.81 EUSTACHIAN TUBE DYSFUNCTION, RIGHT: Primary | ICD-10-CM

## 2023-02-20 RX ORDER — NEOMYCIN SULFATE, POLYMYXIN B SULFATE AND HYDROCORTISONE 10; 3.5; 1 MG/ML; MG/ML; [USP'U]/ML
3 SUSPENSION/ DROPS AURICULAR (OTIC) 4 TIMES DAILY
Qty: 10 ML | Refills: 0 | Status: SHIPPED | OUTPATIENT
Start: 2023-02-20 | End: 2023-03-02

## 2023-04-05 ENCOUNTER — OFFICE VISIT (OUTPATIENT)
Dept: ENT CLINIC | Age: 73
End: 2023-04-05
Payer: MEDICARE

## 2023-04-05 ENCOUNTER — OFFICE VISIT (OUTPATIENT)
Dept: ENT CLINIC | Age: 73
End: 2023-04-05

## 2023-04-05 PROCEDURE — 99214 OFFICE O/P EST MOD 30 MIN: CPT | Performed by: OTOLARYNGOLOGY

## 2023-04-05 PROCEDURE — 69210 REMOVE IMPACTED EAR WAX UNI: CPT | Performed by: OTOLARYNGOLOGY

## 2023-04-05 PROCEDURE — 1123F ACP DISCUSS/DSCN MKR DOCD: CPT | Performed by: OTOLARYNGOLOGY

## 2023-04-05 PROCEDURE — 1101F PT FALLS ASSESS-DOCD LE1/YR: CPT | Performed by: OTOLARYNGOLOGY

## 2023-04-05 PROCEDURE — G8417 CALC BMI ABV UP PARAM F/U: HCPCS | Performed by: OTOLARYNGOLOGY

## 2023-04-05 PROCEDURE — G8427 DOCREV CUR MEDS BY ELIG CLIN: HCPCS | Performed by: OTOLARYNGOLOGY

## 2023-04-05 PROCEDURE — 3017F COLORECTAL CA SCREEN DOC REV: CPT | Performed by: OTOLARYNGOLOGY

## 2023-04-05 PROCEDURE — G8510 SCR DEP NEG, NO PLAN REQD: HCPCS | Performed by: OTOLARYNGOLOGY

## 2023-04-05 PROCEDURE — 92567 TYMPANOMETRY: CPT | Performed by: AUDIOLOGIST

## 2023-04-05 PROCEDURE — 92557 COMPREHENSIVE HEARING TEST: CPT | Performed by: AUDIOLOGIST

## 2023-04-05 PROCEDURE — G8536 NO DOC ELDER MAL SCRN: HCPCS | Performed by: OTOLARYNGOLOGY

## 2023-04-05 NOTE — PROGRESS NOTES
Carmela Daniels, a 81y.o. year old male, was seen in ENT clinic today for a hearing evaluation on referral from Dr. Kleber Conde. Patient complains of intermittent hearing loss (R>L). He was first seen by WANG Donohue in February 2023 for a possible sudden loss; no recent audiogram. There is a history of occupational noise exposure (patient worked at [email or web address]). Otoscopy: normal external ear canals and visible tympanic membranes, bilaterally. Tympanometry: RE Type As, shallow  LE Type As, shallow    SRT: RE Speech Reception Threshold (SRT) was obtained at 30 dBHL LE Speech Reception Threshold (SRT) was obtained at 15 dBHL    WRS: RE Good in quiet when words were presented at 60 dBHL. *30 dB SL per patient MCL  WRS: LE Excellent in quiet when words were presented at 55 dBHL. Pure tone audiometry:  RE: Mild sloping to severe sensorineural hearing loss  LE: WNL through 2000Hz steeply sloping to moderate to severe sensorineural hearing loss    Sensorineural hearing loss, bilaterally    Impressions:  hearing loss requiring medical/otologic and audiologic follow-up    Plan:  Follow-up with ENT. Repeat audiogram 6 months. Consider hearing aid evaluation if hearing loss remains stable.      Chico Anderson   Doctor of Audiology

## 2023-04-05 NOTE — PROGRESS NOTES
Subjective: Ariton Hence   81 y.o.   1950     Followup Visit  This is a 81 y.o. male who is here for sudden right sided hearing loss. He states his hearing suddenly went out on the right side. He began a new medication on 1/20/2023 and his hearing stopped 4 days later. He was on cilostazol 50 mg, he stopped the medication and called his cardiologist, he saw his PCP who put him amoxicillin which did not help with this issues. Denies otalgia or drainage from ear. He endorses a feeling of fullness, and states if he eats on that side, it will come back for a second. 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 4/5/23  6 week followup - pt came prior for a right sudden HL. Was given Medrol Dosepak and antibiotic eardrops, he does not feel any difference. Has now been around 6 weeks since the prior visit. Review of Systems  Review of Systems   Constitutional:  Negative for chills and fever. HENT:  Positive for hearing loss. Negative for ear pain, nosebleeds and tinnitus. Eyes:  Negative for blurred vision and double vision. Respiratory:  Negative for cough, sputum production and shortness of breath. Cardiovascular:  Negative for chest pain and palpitations. Gastrointestinal:  Negative for heartburn, nausea and vomiting. Musculoskeletal:  Negative for joint pain and neck pain. Skin: Negative. Neurological:  Negative for dizziness, speech change, weakness and headaches. Endo/Heme/Allergies:  Negative for environmental allergies. Does not bruise/bleed easily. Psychiatric/Behavioral:  Negative for memory loss. The patient does not have insomnia.         Past Medical History:   Diagnosis Date    Asthma     CAD (coronary artery disease)     Fatty liver     HTN (hypertension)     Migraines     Osteitis pubis ([facility] [address] 12/02/2015     Past Surgical History:   Procedure Laterality Date    HX CARPAL TUNNEL RELEASE      HX CERVICAL FUSION      HX HERNIA REPAIR        History reviewed. No pertinent family history. Social History     Tobacco Use    Smoking status: Former    Smokeless tobacco: Never   Substance Use Topics    Alcohol use: Yes     Alcohol/week: 0.0 standard drinks     Comment: one per week      Prior to Admission medications    Medication Sig Start Date End Date Taking? Authorizing Provider   amLODIPine (NORVASC) 5 mg tablet 1 Tablet daily. 7/17/20  Yes Provider, Historical   EPINEPHrine (EPIPEN) 0.3 mg/0.3 mL injection  7/28/22  Yes Provider, Historical   cholecalciferol (VITAMIN D3) 25 mcg (1,000 unit) cap Take  by mouth daily. Yes Provider, Historical   hydrOXYzine HCL (ATARAX) 25 mg tablet Take  by mouth three (3) times daily as needed. Yes Provider, Historical   SUMAtriptan (IMITREX) 100 mg tablet Take 1 Tablet by mouth once as needed for Migraine. Yes Provider, Historical   methylPREDNISolone (MEDROL DOSEPACK) 4 mg tablet Take 1 Tablet by mouth Specific Days and Specific Times. 2/17/23  Yes Manuelito Escoto NP   carvediloL (COREG) 3.125 mg tablet  10/11/22  Yes Other, MD Kamari   albuterol (PROVENTIL HFA, VENTOLIN HFA, PROAIR HFA) 90 mcg/actuation inhaler Take 2 Puffs by inhalation every four (4) hours as needed for Wheezing. Yes Other, MD Kamari   BIOFLAV,LEMON/VIT BCOMP&C (LIPO-FLAVONOID PLUS PO) Take  by mouth. Yes Provider, Historical   KRILL/OM-3/DHA/EPA/PHOSPHO/AST (MEGARED OMEGA-3 KRILL OIL PO) Take  by mouth. Yes Provider, Historical   losartan (COZAAR) 50 mg tablet 100 mg.   Patient not taking: Reported on 2/17/2023 11/23/15   Provider, Historical        Allergies   Allergen Reactions    Metoprolol Unknown (comments)    Niacin Rash    Oxycodone Other (comments)     Breathing difficulty    Robitussin [Guaifenesin] Other (comments)     Throat swelling         Objective:     Visit Vitals  /80 (BP 1 Location: Left upper arm, BP Patient Position: Sitting, BP Cuff Size: Adult)   Pulse 96   Resp 18   Ht 6' 1\" (1.854 m)   Wt 196 lb (88.9 kg)   SpO2 99%   BMI 25.86 kg/m²        Physical Exam  Vitals reviewed. Constitutional:       General: He is awake. Appearance: Normal appearance. He is normal weight. HENT:      Head: Normocephalic and atraumatic. Jaw: There is normal jaw occlusion. No trismus, tenderness or malocclusion. Salivary Glands: Right salivary gland is not diffusely enlarged or tender. Left salivary gland is not diffusely enlarged or tender. Right Ear: Tympanic membrane, ear canal and external ear normal. Decreased hearing noted. There is impacted cerumen. Left Ear: Hearing, tympanic membrane, ear canal and external ear normal.      Ears:      Dawn exam findings: Does not lateralize. Right Rinne: AC > BC. Left Rinne: AC > BC. Comments: 20 fork is subjectively louder in the left ear     Nose: No septal deviation, mucosal edema or rhinorrhea. Right Turbinates: Not enlarged, swollen or pale. Left Turbinates: Not enlarged, swollen or pale. Right Sinus: No maxillary sinus tenderness or frontal sinus tenderness. Left Sinus: No maxillary sinus tenderness or frontal sinus tenderness. Mouth/Throat:      Lips: Pink. Mouth: Mucous membranes are moist. No oral lesions. Dentition: Normal dentition. No gum lesions. Tongue: No lesions. Palate: No mass and lesions. Pharynx: Oropharynx is clear. Uvula midline. Tonsils: No tonsillar exudate. 0 on the right. 0 on the left. Eyes:      General: Vision grossly intact. Extraocular Movements: Extraocular movements intact. Right eye: No nystagmus. Left eye: No nystagmus. Pupils: Pupils are equal, round, and reactive to light. Neck:      Thyroid: No thyroid mass, thyromegaly or thyroid tenderness. Trachea: Trachea and phonation normal. No tracheal tenderness. Cardiovascular:      Rate and Rhythm: Normal rate and regular rhythm.    Pulmonary:      Effort: Pulmonary effort is normal. Breath sounds: Normal breath sounds. No stridor. No wheezing. Musculoskeletal:         General: Normal range of motion. Cervical back: Normal range of motion. No edema or erythema. Lymphadenopathy:      Cervical: No cervical adenopathy. Skin:     General: Skin is warm and dry. Neurological:      General: No focal deficit present. Mental Status: He is alert and oriented to person, place, and time. Mental status is at baseline. Coordination: Romberg sign negative. Gait: Gait is intact. Psychiatric:         Mood and Affect: Mood normal.         Behavior: Behavior normal. Behavior is cooperative. Procedure - Removal Impacted Cerumen    Indications: cerumen impaction    Ears are examined under microscope/headlight.  right ears are cleaned using otologic curette, suction, and/or alligator forceps. Assessment/Plan:     Encounter Diagnoses   Name Primary? Hearing loss of right ear, unspecified hearing loss type Yes    Impacted cerumen of right ear     Sudden hearing loss, right     Tinnitus of both ears      He had some dry scale and cerumen removed from the deep right tympanic membrane area. Audiogram today confirms a low-frequency asymmetrical sensorineural hearing loss on the right side about a 15 dB difference across several frequencies. I doubt this is related to his new medication. He has taken erectile dysfunction medications in the past but does not recall using anything around the time of his hearing loss. I suspect he had a sudden sensorineural hearing loss back in January. There would be no further benefit of steroid at this time. He should get an MRI to rule out retrocochlear pathology. Follow-up 6 months for repeat audiogram.  I will call him with MRI results.       Orders Placed This Encounter    REMOVE IMPACTED EAR WAX    MRI IAC W WO CONT    REFERRAL TO AUDIOLOGY     Follow-up and Dispositions    Return in about 6 months (around 10/5/2023) for audiogram first then see me.

## 2023-04-05 NOTE — LETTER
4/5/2023    Patient: Jessica Howell   YOB: 1950   Date of Visit: 4/5/2023     Dallsa Esqueda DO  [address] [facility] [address]  Via Fax: [phone]    Dear Dallas Esqueda DO,      Thank you for referring Mr. Chapis Underwood to [facility] EAR NOSE AND THRO[facility] [address] for evaluation. My notes for this consultation are attached. If you have questions, please do not hesitate to call me. I look forward to following your patient along with you.       Sincerely,    Demarcus Tom MD

## 2023-04-05 NOTE — PROGRESS NOTES
Chief Complaint   Patient presents with    Follow-up    Ringing in Ear     Right ear, antibiotics or drops did not help.  Hears different frequencies of sound waves        Visit Vitals  /80 (BP 1 Location: Left upper arm, BP Patient Position: Sitting, BP Cuff Size: Adult)   Pulse 96   Resp 18   Ht 6' 1\" (1.854 m)   Wt 196 lb (88.9 kg)   SpO2 99%   BMI 25.86 kg/m²

## 2023-05-21 ENCOUNTER — HOSPITAL ENCOUNTER (EMERGENCY)
Facility: HOSPITAL | Age: 73
Discharge: HOME | DRG: 149 | End: 2023-05-21
Attending: STUDENT IN AN ORGANIZED HEALTH CARE EDUCATION/TRAINING PROGRAM
Payer: MEDICARE

## 2023-05-21 VITALS
RESPIRATION RATE: 18 BRPM | WEIGHT: 195 LBS | DIASTOLIC BLOOD PRESSURE: 77 MMHG | HEART RATE: 65 BPM | OXYGEN SATURATION: 99 % | TEMPERATURE: 98 F | HEIGHT: 73 IN | BODY MASS INDEX: 25.84 KG/M2 | SYSTOLIC BLOOD PRESSURE: 141 MMHG

## 2023-05-21 DIAGNOSIS — T88.7XXA MEDICATION SIDE EFFECTS: Primary | ICD-10-CM

## 2023-05-21 DIAGNOSIS — R42 DIZZINESS: ICD-10-CM

## 2023-05-21 LAB
ALBUMIN SERPL-MCNC: 3.9 G/DL (ref 3.5–5)
ALBUMIN/GLOB SERPL: 1.1 (ref 1.1–2.2)
ALP SERPL-CCNC: 84 U/L (ref 45–117)
ALT SERPL-CCNC: 32 U/L (ref 12–78)
ANION GAP SERPL CALC-SCNC: 10 MMOL/L (ref 5–15)
AST SERPL W P-5'-P-CCNC: 27 U/L (ref 15–37)
BASOPHILS # BLD: 0 K/UL (ref 0–0.1)
BASOPHILS NFR BLD: 1 % (ref 0–1)
BILIRUB SERPL-MCNC: 0.5 MG/DL (ref 0.2–1)
BUN SERPL-MCNC: 17 MG/DL (ref 6–20)
BUN/CREAT SERPL: 23 (ref 12–20)
CA-I BLD-MCNC: 8.9 MG/DL (ref 8.5–10.1)
CHLORIDE SERPL-SCNC: 103 MMOL/L (ref 97–108)
CO2 SERPL-SCNC: 27 MMOL/L (ref 21–32)
CREAT SERPL-MCNC: 0.75 MG/DL (ref 0.7–1.3)
DIFFERENTIAL METHOD BLD: ABNORMAL
EOSINOPHIL # BLD: 0.5 K/UL (ref 0–0.4)
EOSINOPHIL NFR BLD: 8 % (ref 0–7)
ERYTHROCYTE [DISTWIDTH] IN BLOOD BY AUTOMATED COUNT: 12.4 % (ref 11.5–14.5)
GLOBULIN SER CALC-MCNC: 3.6 G/DL (ref 2–4)
GLUCOSE SERPL-MCNC: 104 MG/DL (ref 65–100)
HCT VFR BLD AUTO: 44 % (ref 36.6–50.3)
HGB BLD-MCNC: 14.8 G/DL (ref 12.1–17)
IMM GRANULOCYTES # BLD AUTO: 0 K/UL (ref 0–0.04)
IMM GRANULOCYTES NFR BLD AUTO: 0 % (ref 0–0.5)
LYMPHOCYTES # BLD: 1.9 K/UL (ref 0.8–3.5)
LYMPHOCYTES NFR BLD: 33 % (ref 12–49)
MCH RBC QN AUTO: 30.5 PG (ref 26–34)
MCHC RBC AUTO-ENTMCNC: 33.6 G/DL (ref 30–36.5)
MCV RBC AUTO: 90.7 FL (ref 80–99)
MONOCYTES # BLD: 0.9 K/UL (ref 0–1)
MONOCYTES NFR BLD: 16 % (ref 5–13)
NEUTS SEG # BLD: 2.6 K/UL (ref 1.8–8)
NEUTS SEG NFR BLD: 42 % (ref 32–75)
PLATELET # BLD AUTO: 140 K/UL (ref 150–400)
PMV BLD AUTO: 10.9 FL (ref 8.9–12.9)
POTASSIUM SERPL-SCNC: 4.8 MMOL/L (ref 3.5–5.1)
PROT SERPL-MCNC: 7.5 G/DL (ref 6.4–8.2)
RBC # BLD AUTO: 4.85 M/UL (ref 4.1–5.7)
SODIUM SERPL-SCNC: 140 MMOL/L (ref 136–145)
WBC # BLD AUTO: 6 K/UL (ref 4.1–11.1)

## 2023-05-21 PROCEDURE — 6370000000 HC RX 637 (ALT 250 FOR IP): Performed by: STUDENT IN AN ORGANIZED HEALTH CARE EDUCATION/TRAINING PROGRAM

## 2023-05-21 PROCEDURE — 99284 EMERGENCY DEPT VISIT MOD MDM: CPT

## 2023-05-21 PROCEDURE — 36415 COLL VENOUS BLD VENIPUNCTURE: CPT

## 2023-05-21 PROCEDURE — 85025 COMPLETE CBC W/AUTO DIFF WBC: CPT

## 2023-05-21 PROCEDURE — 80053 COMPREHEN METABOLIC PANEL: CPT

## 2023-05-21 PROCEDURE — 93005 ELECTROCARDIOGRAM TRACING: CPT | Performed by: STUDENT IN AN ORGANIZED HEALTH CARE EDUCATION/TRAINING PROGRAM

## 2023-05-21 RX ORDER — CARVEDILOL 3.12 MG/1
3.12 TABLET ORAL
Status: COMPLETED | OUTPATIENT
Start: 2023-05-21 | End: 2023-05-21

## 2023-05-21 RX ORDER — AMLODIPINE BESYLATE 5 MG/1
5 TABLET ORAL ONCE
Status: COMPLETED | OUTPATIENT
Start: 2023-05-21 | End: 2023-05-21

## 2023-05-21 RX ADMIN — AMLODIPINE BESYLATE 5 MG: 5 TABLET ORAL at 20:59

## 2023-05-21 RX ADMIN — CARVEDILOL 3.12 MG: 3.12 TABLET, FILM COATED ORAL at 20:59

## 2023-05-21 ASSESSMENT — PAIN - FUNCTIONAL ASSESSMENT: PAIN_FUNCTIONAL_ASSESSMENT: 0-10

## 2023-05-21 ASSESSMENT — PAIN DESCRIPTION - LOCATION: LOCATION: HEAD

## 2023-05-21 ASSESSMENT — PAIN SCALES - GENERAL: PAINLEVEL_OUTOF10: 5

## 2023-05-22 LAB
EKG ATRIAL RATE: 65 BPM
EKG DIAGNOSIS: NORMAL
EKG P AXIS: 31 DEGREES
EKG P-R INTERVAL: 180 MS
EKG Q-T INTERVAL: 386 MS
EKG QRS DURATION: 86 MS
EKG QTC CALCULATION (BAZETT): 401 MS
EKG R AXIS: -28 DEGREES
EKG T AXIS: 30 DEGREES
EKG VENTRICULAR RATE: 65 BPM

## 2023-05-22 PROCEDURE — 93010 ELECTROCARDIOGRAM REPORT: CPT | Performed by: INTERNAL MEDICINE

## 2023-05-22 NOTE — ED PROVIDER NOTES
[facility] EMERGENCY DEPARTMENT  EMERGENCY DEPARTMENT HISTORY AND PHYSICAL EXAM      Date: 5/21/2023  Patient Name: David Plummer  MRN: [id number]  [place] 1950  Date of evaluation: 5/21/2023  Provider: Simi Jeter MD   Note Started: 6:23 AM EDT 5/22/23    HISTORY OF PRESENT ILLNESS     Chief Complaint   Patient presents with    Hypertension    Dizziness       History Provided By: Patient    HPI: David Plummer is a 81 y.o. male who presents for evaluation of dizziness. Patient reports that this evening after taking an antihistamine he has felt dizzy with certain position changes. He describes that as feeling \"lightheaded,\" denies any room spinning sensation. No syncope, no headache, no vision changes, no focal neurologic complaints. PAST MEDICAL HISTORY   Past Medical History:  Past Medical History:   Diagnosis Date    Asthma     CAD (coronary artery disease)     Fatty liver     HTN (hypertension)     Migraines     Osteitis pubis ([address] 12/02/2015       Past Surgical History:  Past Surgical History:   Procedure Laterality Date    CARPAL TUNNEL RELEASE      CERVICAL FUSION      HERNIA REPAIR         Family History:  History reviewed. No pertinent family history. Social History:  Social History     Tobacco Use    Smoking status: Former    Smokeless tobacco: Never   Substance Use Topics    Alcohol use: Yes     Alcohol/week: 0.0 standard drinks       Allergies: Allergies   Allergen Reactions    Guaifenesin Other (See Comments)     Throat swelling    Metoprolol      Other reaction(s): Unknown (comments)    Oxycodone Other (See Comments)     Breathing difficulty    Niacin Rash       PCP: Suzi Howell DO    Current Meds:   No current facility-administered medications for this encounter.      Current Outpatient Medications   Medication Sig Dispense Refill    albuterol sulfate HFA (PROVENTIL;VENTOLIN;PROAIR) 108 (90 Base) MCG/ACT inhaler Inhale 2 puffs into the lungs every 4 hours as needed

## 2023-05-22 NOTE — ED TRIAGE NOTES
Pt c/o dizziness and elevated BP since this morning; pt admits to having poison ivy and tick bites present prior to symptoms starting

## 2023-05-23 ENCOUNTER — TELEPHONE (OUTPATIENT)
Age: 73
End: 2023-05-23

## 2023-05-24 ENCOUNTER — HOSPITAL ENCOUNTER (INPATIENT)
Facility: HOSPITAL | Age: 73
LOS: 2 days | Discharge: HOME | DRG: 149 | End: 2023-05-26
Attending: EMERGENCY MEDICINE | Admitting: HOSPITALIST
Payer: MEDICARE

## 2023-05-24 ENCOUNTER — APPOINTMENT (OUTPATIENT)
Facility: HOSPITAL | Age: 73
DRG: 149 | End: 2023-05-24
Payer: MEDICARE

## 2023-05-24 DIAGNOSIS — R42 VERTIGO: ICD-10-CM

## 2023-05-24 DIAGNOSIS — R42 DIZZINESS: Primary | ICD-10-CM

## 2023-05-24 LAB
ALBUMIN SERPL-MCNC: 3.7 G/DL (ref 3.5–5)
ALBUMIN/GLOB SERPL: 1 (ref 1.1–2.2)
ALP SERPL-CCNC: 75 U/L (ref 45–117)
ALT SERPL-CCNC: 35 U/L (ref 12–78)
ANION GAP SERPL CALC-SCNC: 4 MMOL/L (ref 5–15)
AST SERPL W P-5'-P-CCNC: 14 U/L (ref 15–37)
BASOPHILS # BLD: 0.1 K/UL (ref 0–0.1)
BASOPHILS NFR BLD: 1 % (ref 0–1)
BILIRUB SERPL-MCNC: 0.6 MG/DL (ref 0.2–1)
BUN SERPL-MCNC: 16 MG/DL (ref 6–20)
BUN/CREAT SERPL: 19 (ref 12–20)
CA-I BLD-MCNC: 9.1 MG/DL (ref 8.5–10.1)
CHLORIDE SERPL-SCNC: 101 MMOL/L (ref 97–108)
CO2 SERPL-SCNC: 29 MMOL/L (ref 21–32)
CREAT SERPL-MCNC: 0.85 MG/DL (ref 0.7–1.3)
DIFFERENTIAL METHOD BLD: ABNORMAL
EOSINOPHIL # BLD: 0.5 K/UL (ref 0–0.4)
EOSINOPHIL NFR BLD: 8 % (ref 0–7)
ERYTHROCYTE [DISTWIDTH] IN BLOOD BY AUTOMATED COUNT: 12.3 % (ref 11.5–14.5)
GLOBULIN SER CALC-MCNC: 3.6 G/DL (ref 2–4)
GLUCOSE SERPL-MCNC: 143 MG/DL (ref 65–100)
HCT VFR BLD AUTO: 42 % (ref 36.6–50.3)
HGB BLD-MCNC: 14.2 G/DL (ref 12.1–17)
IMM GRANULOCYTES # BLD AUTO: 0 K/UL (ref 0–0.04)
IMM GRANULOCYTES NFR BLD AUTO: 0 % (ref 0–0.5)
LYMPHOCYTES # BLD: 1.9 K/UL (ref 0.8–3.5)
LYMPHOCYTES NFR BLD: 32 % (ref 12–49)
MAGNESIUM SERPL-MCNC: 2 MG/DL (ref 1.6–2.4)
MCH RBC QN AUTO: 30.3 PG (ref 26–34)
MCHC RBC AUTO-ENTMCNC: 33.8 G/DL (ref 30–36.5)
MCV RBC AUTO: 89.7 FL (ref 80–99)
MONOCYTES # BLD: 0.6 K/UL (ref 0–1)
MONOCYTES NFR BLD: 10 % (ref 5–13)
NEUTS SEG # BLD: 2.8 K/UL (ref 1.8–8)
NEUTS SEG NFR BLD: 49 % (ref 32–75)
NRBC # BLD: 0 K/UL (ref 0–0.01)
NRBC BLD-RTO: 0 PER 100 WBC
PLATELET # BLD AUTO: 150 K/UL (ref 150–400)
PMV BLD AUTO: 11 FL (ref 8.9–12.9)
POTASSIUM SERPL-SCNC: 4 MMOL/L (ref 3.5–5.1)
PROCALCITONIN SERPL-MCNC: 0.05 NG/ML
PROT SERPL-MCNC: 7.3 G/DL (ref 6.4–8.2)
RBC # BLD AUTO: 4.68 M/UL (ref 4.1–5.7)
SODIUM SERPL-SCNC: 134 MMOL/L (ref 136–145)
TROPONIN I SERPL HS-MCNC: 5 NG/L (ref 0–76)
WBC # BLD AUTO: 5.7 K/UL (ref 4.1–11.1)

## 2023-05-24 PROCEDURE — 83735 ASSAY OF MAGNESIUM: CPT

## 2023-05-24 PROCEDURE — 6360000004 HC RX CONTRAST MEDICATION: Performed by: EMERGENCY MEDICINE

## 2023-05-24 PROCEDURE — 85025 COMPLETE CBC W/AUTO DIFF WBC: CPT

## 2023-05-24 PROCEDURE — 84145 PROCALCITONIN (PCT): CPT

## 2023-05-24 PROCEDURE — 70498 CT ANGIOGRAPHY NECK: CPT

## 2023-05-24 PROCEDURE — 99222 1ST HOSP IP/OBS MODERATE 55: CPT | Performed by: OTOLARYNGOLOGY

## 2023-05-24 PROCEDURE — 36415 COLL VENOUS BLD VENIPUNCTURE: CPT

## 2023-05-24 PROCEDURE — 6360000002 HC RX W HCPCS: Performed by: HOSPITALIST

## 2023-05-24 PROCEDURE — 1100000000 HC RM PRIVATE

## 2023-05-24 PROCEDURE — 84484 ASSAY OF TROPONIN QUANT: CPT

## 2023-05-24 PROCEDURE — 80053 COMPREHEN METABOLIC PANEL: CPT

## 2023-05-24 PROCEDURE — 99285 EMERGENCY DEPT VISIT HI MDM: CPT

## 2023-05-24 PROCEDURE — 6370000000 HC RX 637 (ALT 250 FOR IP): Performed by: HOSPITALIST

## 2023-05-24 PROCEDURE — 2580000003 HC RX 258: Performed by: HOSPITALIST

## 2023-05-24 PROCEDURE — 93005 ELECTROCARDIOGRAM TRACING: CPT | Performed by: EMERGENCY MEDICINE

## 2023-05-24 PROCEDURE — 6370000000 HC RX 637 (ALT 250 FOR IP): Performed by: EMERGENCY MEDICINE

## 2023-05-24 RX ORDER — ONDANSETRON 2 MG/ML
4 INJECTION INTRAMUSCULAR; INTRAVENOUS EVERY 6 HOURS PRN
Status: DISCONTINUED | OUTPATIENT
Start: 2023-05-24 | End: 2023-05-26 | Stop reason: HOSPADM

## 2023-05-24 RX ORDER — LOSARTAN POTASSIUM 50 MG/1
100 TABLET ORAL DAILY
Status: DISCONTINUED | OUTPATIENT
Start: 2023-05-24 | End: 2023-05-26 | Stop reason: HOSPADM

## 2023-05-24 RX ORDER — SODIUM CHLORIDE 9 MG/ML
INJECTION, SOLUTION INTRAVENOUS PRN
Status: DISCONTINUED | OUTPATIENT
Start: 2023-05-24 | End: 2023-05-26 | Stop reason: HOSPADM

## 2023-05-24 RX ORDER — CARVEDILOL 3.12 MG/1
3.12 TABLET ORAL 2 TIMES DAILY
Status: DISCONTINUED | OUTPATIENT
Start: 2023-05-24 | End: 2023-05-26 | Stop reason: HOSPADM

## 2023-05-24 RX ORDER — POLYETHYLENE GLYCOL 3350 17 G/17G
17 POWDER, FOR SOLUTION ORAL DAILY PRN
Status: DISCONTINUED | OUTPATIENT
Start: 2023-05-24 | End: 2023-05-26 | Stop reason: HOSPADM

## 2023-05-24 RX ORDER — ALBUTEROL SULFATE 90 UG/1
2 AEROSOL, METERED RESPIRATORY (INHALATION) EVERY 4 HOURS PRN
Status: DISCONTINUED | OUTPATIENT
Start: 2023-05-24 | End: 2023-05-26 | Stop reason: HOSPADM

## 2023-05-24 RX ORDER — HYDRALAZINE HYDROCHLORIDE 20 MG/ML
10 INJECTION INTRAMUSCULAR; INTRAVENOUS EVERY 6 HOURS PRN
Status: DISCONTINUED | OUTPATIENT
Start: 2023-05-24 | End: 2023-05-26 | Stop reason: HOSPADM

## 2023-05-24 RX ORDER — SODIUM CHLORIDE 0.9 % (FLUSH) 0.9 %
5-40 SYRINGE (ML) INJECTION PRN
Status: DISCONTINUED | OUTPATIENT
Start: 2023-05-24 | End: 2023-05-26 | Stop reason: HOSPADM

## 2023-05-24 RX ORDER — ACETAMINOPHEN 650 MG/1
650 SUPPOSITORY RECTAL EVERY 6 HOURS PRN
Status: DISCONTINUED | OUTPATIENT
Start: 2023-05-24 | End: 2023-05-26 | Stop reason: HOSPADM

## 2023-05-24 RX ORDER — MECLIZINE HYDROCHLORIDE 25 MG/1
25 TABLET ORAL 3 TIMES DAILY PRN
Status: DISCONTINUED | OUTPATIENT
Start: 2023-05-24 | End: 2023-05-25

## 2023-05-24 RX ORDER — MAGNESIUM HYDROXIDE/ALUMINUM HYDROXICE/SIMETHICONE 120; 1200; 1200 MG/30ML; MG/30ML; MG/30ML
30 SUSPENSION ORAL EVERY 6 HOURS PRN
Status: DISCONTINUED | OUTPATIENT
Start: 2023-05-24 | End: 2023-05-26 | Stop reason: HOSPADM

## 2023-05-24 RX ORDER — AMLODIPINE BESYLATE 5 MG/1
5 TABLET ORAL 2 TIMES DAILY
Status: DISCONTINUED | OUTPATIENT
Start: 2023-05-24 | End: 2023-05-26 | Stop reason: HOSPADM

## 2023-05-24 RX ORDER — ONDANSETRON 4 MG/1
4 TABLET, ORALLY DISINTEGRATING ORAL EVERY 8 HOURS PRN
Status: DISCONTINUED | OUTPATIENT
Start: 2023-05-24 | End: 2023-05-26 | Stop reason: HOSPADM

## 2023-05-24 RX ORDER — ENOXAPARIN SODIUM 100 MG/ML
40 INJECTION SUBCUTANEOUS DAILY
Status: DISCONTINUED | OUTPATIENT
Start: 2023-05-24 | End: 2023-05-26 | Stop reason: HOSPADM

## 2023-05-24 RX ORDER — ACETAMINOPHEN 325 MG/1
650 TABLET ORAL EVERY 6 HOURS PRN
Status: DISCONTINUED | OUTPATIENT
Start: 2023-05-24 | End: 2023-05-26 | Stop reason: HOSPADM

## 2023-05-24 RX ORDER — SODIUM CHLORIDE 0.9 % (FLUSH) 0.9 %
5-40 SYRINGE (ML) INJECTION EVERY 12 HOURS SCHEDULED
Status: DISCONTINUED | OUTPATIENT
Start: 2023-05-24 | End: 2023-05-26 | Stop reason: HOSPADM

## 2023-05-24 RX ORDER — AMLODIPINE BESYLATE 5 MG/1
5 TABLET ORAL DAILY
Status: DISCONTINUED | OUTPATIENT
Start: 2023-05-24 | End: 2023-05-24

## 2023-05-24 RX ADMIN — ENOXAPARIN SODIUM 40 MG: 100 INJECTION SUBCUTANEOUS at 14:04

## 2023-05-24 RX ADMIN — CARVEDILOL 3.12 MG: 3.12 TABLET, FILM COATED ORAL at 20:08

## 2023-05-24 RX ADMIN — SODIUM CHLORIDE, PRESERVATIVE FREE 10 ML: 5 INJECTION INTRAVENOUS at 20:16

## 2023-05-24 RX ADMIN — IOPAMIDOL 100 ML: 755 INJECTION, SOLUTION INTRAVENOUS at 10:32

## 2023-05-24 RX ADMIN — AMLODIPINE BESYLATE 5 MG: 5 TABLET ORAL at 20:07

## 2023-05-24 RX ADMIN — MECLIZINE HYDROCHLORIDE 25 MG: 25 TABLET ORAL at 09:13

## 2023-05-24 ASSESSMENT — PAIN DESCRIPTION - LOCATION: LOCATION: EAR

## 2023-05-24 ASSESSMENT — PAIN SCALES - GENERAL: PAINLEVEL_OUTOF10: 8

## 2023-05-24 ASSESSMENT — LIFESTYLE VARIABLES
HOW OFTEN DO YOU HAVE A DRINK CONTAINING ALCOHOL: NEVER
HOW MANY STANDARD DRINKS CONTAINING ALCOHOL DO YOU HAVE ON A TYPICAL DAY: PATIENT DOES NOT DRINK

## 2023-05-24 ASSESSMENT — PAIN - FUNCTIONAL ASSESSMENT: PAIN_FUNCTIONAL_ASSESSMENT: 0-10

## 2023-05-24 NOTE — ED NOTES
Pt placed on continuous cardiac monitoring, pulse ox, and blood pressure monitoring at this time.        Jaquita Eisenmenger  05/24/23 4033

## 2023-05-24 NOTE — ED TRIAGE NOTES
Pt arrives to ED with complaints of dizziness, vertigo, nauseous since this last night but got worse this morning. Was seen recently for similar episode. Scheduled for a MRI of his R ear tomorrow.

## 2023-05-24 NOTE — H&P
hydroxide-simethicone (MAALOX) 569-204-17 MG/5ML suspension 30 mL  30 mL Oral Q6H PRN    albuterol sulfate HFA (PROVENTIL;VENTOLIN;PROAIR) 108 (90 Base) MCG/ACT inhaler 2 puff  2 puff Inhalation Q4H PRN    amLODIPine (NORVASC) tablet 5 mg  5 mg Oral Daily    carvedilol (COREG) tablet 3.125 mg  3.125 mg Oral BID    losartan (COZAAR) tablet 100 mg  100 mg Oral Daily    hydrALAZINE (APRESOLINE) injection 10 mg  10 mg IntraVENous Q6H PRN     Current Outpatient Medications   Medication Sig    albuterol sulfate HFA (PROVENTIL;VENTOLIN;PROAIR) 108 (90 Base) MCG/ACT inhaler Inhale 2 puffs into the lungs every 4 hours as needed    amLODIPine (NORVASC) 5 MG tablet 1 tablet daily    carvedilol (COREG) 3.125 MG tablet ceived the following from [facility]: Outside name: carvediloL (COREG) 3.125 mg tablet    vitamin D 25 MCG (1000 UT) CAPS Take by mouth daily    EPINEPHrine (EPIPEN) 0.3 MG/0.3ML SOAJ injection ceived the following from [facility]: Outside name: EPINEPHrine (EPIPEN) 0.3 mg/0.3 mL injection    hydrOXYzine HCl (ATARAX) 25 MG tablet Take by mouth 3 times daily as needed    losartan (COZAAR) 50 MG tablet 100 mg    methylPREDNISolone (MEDROL DOSEPACK) 4 MG tablet Take 4 mg by mouth    SUMAtriptan (IMITREX) 100 MG tablet Take 100 mg by mouth once as needed        This is dictation was done by dragon, computer voice recognition software. Quite often unanticipated grammatical, syntax, homophones and other interpretive errors or inadvertently transcribed by the computer software. Please excuse errors that have escaped final proofreading. Please contact me for any questions or details.   Thank you.   ___________________________________________________    Attending Physician: Khurram Campos MD   5/24/2023

## 2023-05-24 NOTE — ED NOTES
TRANSFER - OUT REPORT:    Report faxed on Lauren Guan  being transferred to [facility] for routine progression of patient care       Report consisted of patient's Situation, Background, Assessment and   Recommendations(SBAR). Information from the following report(s) Nurse Handoff Report was reviewed with the receiving nurse. [place] Assessment: Presents to emergency department  because of falls (Syncope, seizure, or loss of consciousness): No, Age > 79: No, Altered Mental Status, Intoxication with alcohol or substance confusion (Disorientation, impaired judgment, poor safety awaremess, or inability to follow instructions): Yes, Impaired Mobility: Ambulates or transfers with assistive devices or assistance; Unable to ambulate or transer.: No, Nursing Judgement: Yes  Lines:   Peripheral IV 05/24/23 Right Antecubital (Active)        Opportunity for questions and clarification was provided.       Patient transported with:  Monitor and [address], [address] [place] [address]  05/24/23 0133

## 2023-05-24 NOTE — CARE COORDINATION
05/24/23 1158   Service Assessment   Patient Orientation Alert and Oriented   Cognition Alert   History Provided By Patient   Primary Caregiver Self   Accompanied By/Relationship Daughter Sofía Brenner). Support Systems Spouse/Significant Other;Children   Patient's Healthcare Decision Maker is: Legal Next of Kin   PCP Verified by CM Yes  Carlos Cadet - seen on 5/23/23.)   Last Visit to PCP Within last 3 months   Prior Functional Level Independent in ADLs/IADLs   Current Functional Level Independent in ADLs/IADLs   Can patient return to prior living arrangement Yes   Ability to make needs known: Good   Family able to assist with home care needs: Yes   Would you like for me to discuss the discharge plan with any other family members/significant others, and if so, who? Yes  (Wife & daughter.)   Financial Resources Medicare   Community Resources None   Social/Functional History   Lives With Spouse;Daughter   Type of [address] to enter without rails   Entrance Stairs - Number of Steps 3 steps out front. Bathroom Shower/Tub Walk-in shower   Bathroom Toilet Standard   Bathroom Equipment None   Bathroom Accessibility Accessible   Home Equipment Cane;Walker, standard  (If needed.)   ADL Assistance Independent   Homemaking Assistance Independent   Homemaking Responsibilities Yes   Transfer Assistance Independent   Active  Yes   Occupation Retired   Discharge Planning   Type of [address] Prior To Admission None   Potential Assistance Needed N/A   DME Ordered? No   Potential Assistance Purchasing Medications No   Type of Home Care Services None   Patient expects to be discharged to: House   One/Two Story Residence Two story   History of falls?  0   Services At/After Discharge   Transition of Care Consult (CM Consult) Discharge Planning   Services [address] Discharge None   [employer] Information

## 2023-05-24 NOTE — ED PROVIDER NOTES
[facility] EMERGENCY DEPT  EMERGENCY DEPARTMENT HISTORY AND PHYSICAL EXAM      Date: 5/24/2023  Patient Name: Anant Prescott  MRN: [id number]  [place] 1950  Date of evaluation: 5/24/2023  Provider: Leena Solano MD   Note Started: 11:09 AM EDT 5/24/23    HISTORY OF PRESENT ILLNESS     Chief Complaint   Patient presents with    Dizziness       History Provided By: Patient    HPI: Anant Prescott is a 81 y.o. male presents with dizziness and vertigo-like symptoms has been following with ENT but however symptoms have been worsening. They do plan on doing an MRI of the ear area soon. They were thinking about procedures but wanted to wait for the MRI. Patient's dizziness however so pronounced today he could not function or walk. He does not have a headache now but had 1 over the weekend. No extremity numbness or weakness. PAST MEDICAL HISTORY   Past Medical History:  Past Medical History:   Diagnosis Date    Asthma     CAD (coronary artery disease)     Fatty liver     HTN (hypertension)     Migraines     Osteitis pubis ([address] 12/02/2015       Past Surgical History:  Past Surgical History:   Procedure Laterality Date    CARPAL TUNNEL RELEASE      CERVICAL FUSION      HERNIA REPAIR         Family History:  History reviewed. No pertinent family history. Social History:  Social History     Tobacco Use    Smoking status: Former    Smokeless tobacco: Never   Substance Use Topics    Alcohol use: Yes     Alcohol/week: 0.0 standard drinks       Allergies:   Allergies   Allergen Reactions    Guaifenesin Other (See Comments)     Throat swelling    Metoprolol      Other reaction(s): Unknown (comments)    Oxycodone Other (See Comments)     Breathing difficulty    Niacin Rash       PCP: Norbert Avery DO    Current Meds:   Current Facility-Administered Medications   Medication Dose Route Frequency Provider Last Rate Last Admin    meclizine (ANTIVERT) tablet 25 mg  25 mg Oral TID PRN Leena Solano MD   25 mg at

## 2023-05-24 NOTE — CONSULTS
Otolaryngology-Head and Neck Surgery  Inpatient Consultation    Patient: Lou Eagle  YOB: 1950  MRN: [id number]  Date of Service:  5/24/2023    Reason for Consultation:  Vertigo  Requesting Physician:  ER    History of Present Illness: Lou Eagle is a 81year old male who has a history of a likely R sudden sensorineural hearing loss in January of this year    He was seen by Dr Brent Hart last month and MRI was recommended due to asymmetry    MRI IAC/brain is scheduled [facility] tomorrow at [facility] as he requires open MRI  Was not able to tolerate oral sedation    A few days ago he developed imbalance and last night developed severe vertigo with room spinning when he tries to move in any direction. Comfortable at rest    Hearing overall feels stable but he does endorse aural fullness    Past Medical History:  Past Medical History:   Diagnosis Date    Asthma     CAD (coronary artery disease)     Fatty liver     HTN (hypertension)     Migraines     Osteitis pubis ([facility]) 12/02/2015       Past Surgical History:   Past Surgical History:   Procedure Laterality Date    CARPAL TUNNEL RELEASE      CERVICAL FUSION      HERNIA REPAIR         Medications:   Current Outpatient Medications   Medication Instructions    albuterol sulfate HFA (PROVENTIL;VENTOLIN;PROAIR) 108 (90 Base) MCG/ACT inhaler 2 puffs, Inhalation, EVERY 4 HOURS PRN    amLODIPine (NORVASC) 10 mg, DAILY    carvedilol (COREG) 3.125 mg, Oral, 2 TIMES DAILY    EPINEPHrine (EPIPEN) 0.3 MG/0.3ML SOAJ injection ceived the following from [facility]: Outside name: EPINEPHrine (EPIPEN) 0.3 mg/0.3 mL injection    hydrOXYzine HCl (ATARAX) 25 mg, Oral, AS NEEDED    vitamin D 1,000 Units, Oral, DAILY       Allergies:    Allergies   Allergen Reactions    Guaifenesin Other (See Comments)     Throat swelling    Metoprolol      Other reaction(s): Unknown (comments)    Oxycodone Other (See Comments)     Breathing difficulty    Niacin Rash

## 2023-05-25 LAB
EKG ATRIAL RATE: 82 BPM
EKG DIAGNOSIS: NORMAL
EKG P AXIS: 40 DEGREES
EKG P-R INTERVAL: 188 MS
EKG Q-T INTERVAL: 348 MS
EKG QRS DURATION: 72 MS
EKG QTC CALCULATION (BAZETT): 406 MS
EKG R AXIS: -53 DEGREES
EKG T AXIS: 56 DEGREES
EKG VENTRICULAR RATE: 82 BPM

## 2023-05-25 PROCEDURE — 97530 THERAPEUTIC ACTIVITIES: CPT

## 2023-05-25 PROCEDURE — 2580000003 HC RX 258: Performed by: HOSPITALIST

## 2023-05-25 PROCEDURE — 1100000000 HC RM PRIVATE

## 2023-05-25 PROCEDURE — 97165 OT EVAL LOW COMPLEX 30 MIN: CPT

## 2023-05-25 PROCEDURE — 97161 PT EVAL LOW COMPLEX 20 MIN: CPT

## 2023-05-25 PROCEDURE — 93010 ELECTROCARDIOGRAM REPORT: CPT | Performed by: INTERNAL MEDICINE

## 2023-05-25 PROCEDURE — 6370000000 HC RX 637 (ALT 250 FOR IP): Performed by: GENERAL ACUTE CARE HOSPITAL

## 2023-05-25 PROCEDURE — 6370000000 HC RX 637 (ALT 250 FOR IP): Performed by: HOSPITALIST

## 2023-05-25 RX ORDER — MECLIZINE HYDROCHLORIDE 25 MG/1
25 TABLET ORAL 3 TIMES DAILY
Status: DISCONTINUED | OUTPATIENT
Start: 2023-05-25 | End: 2023-05-26 | Stop reason: HOSPADM

## 2023-05-25 RX ORDER — PREDNISONE 20 MG/1
20 TABLET ORAL 2 TIMES DAILY
Status: DISCONTINUED | OUTPATIENT
Start: 2023-05-25 | End: 2023-05-26 | Stop reason: HOSPADM

## 2023-05-25 RX ADMIN — PREDNISONE 20 MG: 20 TABLET ORAL at 12:46

## 2023-05-25 RX ADMIN — CARVEDILOL 3.12 MG: 3.12 TABLET, FILM COATED ORAL at 21:40

## 2023-05-25 RX ADMIN — PREDNISONE 20 MG: 20 TABLET ORAL at 21:40

## 2023-05-25 RX ADMIN — SODIUM CHLORIDE, PRESERVATIVE FREE 10 ML: 5 INJECTION INTRAVENOUS at 21:40

## 2023-05-25 RX ADMIN — AMLODIPINE BESYLATE 5 MG: 5 TABLET ORAL at 09:15

## 2023-05-25 RX ADMIN — AMLODIPINE BESYLATE 5 MG: 5 TABLET ORAL at 21:40

## 2023-05-25 RX ADMIN — CARVEDILOL 3.12 MG: 3.12 TABLET, FILM COATED ORAL at 09:15

## 2023-05-25 RX ADMIN — MECLIZINE HYDROCHLORIDE 25 MG: 25 TABLET ORAL at 12:46

## 2023-05-25 RX ADMIN — SODIUM CHLORIDE, PRESERVATIVE FREE 10 ML: 5 INJECTION INTRAVENOUS at 09:44

## 2023-05-25 RX ADMIN — ACETAMINOPHEN 650 MG: 325 TABLET ORAL at 12:45

## 2023-05-25 RX ADMIN — ACETAMINOPHEN 650 MG: 325 TABLET ORAL at 06:18

## 2023-05-25 RX ADMIN — MECLIZINE HYDROCHLORIDE 25 MG: 25 TABLET ORAL at 21:40

## 2023-05-25 ASSESSMENT — PAIN SCALES - GENERAL
PAINLEVEL_OUTOF10: 0
PAINLEVEL_OUTOF10: 4
PAINLEVEL_OUTOF10: 3

## 2023-05-25 ASSESSMENT — PAIN SCALES - WONG BAKER
WONGBAKER_NUMERICALRESPONSE: 2
WONGBAKER_NUMERICALRESPONSE: 0

## 2023-05-25 ASSESSMENT — PAIN DESCRIPTION - LOCATION
LOCATION: HEAD
LOCATION: HEAD

## 2023-05-25 ASSESSMENT — PAIN DESCRIPTION - ORIENTATION: ORIENTATION: ANTERIOR

## 2023-05-25 ASSESSMENT — PAIN - FUNCTIONAL ASSESSMENT: PAIN_FUNCTIONAL_ASSESSMENT: PREVENTS OR INTERFERES SOME ACTIVE ACTIVITIES AND ADLS

## 2023-05-25 ASSESSMENT — PAIN DESCRIPTION - DESCRIPTORS: DESCRIPTORS: ACHING

## 2023-05-25 NOTE — PROGRESS NOTES
Hospitalist Progress Note    NAME:   Mary Pappas   : 1950   MRN: [id number]     Date/Time: 2023    Patient PCP: Zarina Rogers DO    Estimated discharge date: 1 days  Barriers: resolution of vertigo       Assessment / Plan:  Asymmetric SNHL  Vertigo  Start meclizine 25 as needed  CTA head and neck negative  ENT consulted   Pt thinks he needs to be under sedation/anesthesia to get MRI  Was scheduled for open MRI today at [facility], needs to reschedule it   Start prednisone as per ENT  Change Meclizine to scheduled 25 mg TID   May add valium if not improving       Right ear tinnitus-ENT consulted follow recommendations  HTN-monitor BP on home meds  Chronic asthma-stable continue as needed inhalers    Medical Decision Making:   I personally reviewed labs: yes   I personally reviewed imaging: yes  Toxic drug monitoring:   Discussed case with: Pt  Code Status: Full Code   DVT Prophylaxis: lovenox   GI Prophylaxis:  NOK:  wife    Subjective:     R ear fullness   Still feeling dizzy and unsteady  D/w wife  Discussed with RN events overnight. Objective:     VITALS:   Last 24hrs VS reviewed since prior progress note.  Most recent are:  Patient Vitals for the past 24 hrs:   BP Temp Temp src Pulse Resp SpO2   23 0750 128/75 97.5 °F (36.4 °C) Axillary 62 18 98 %   23 0015 (!) 153/91 98.6 °F (37 °C) Oral 76 18 99 %   23 1930 (!) 146/89 98.6 °F (37 °C) Oral 78 18 100 %   23 1630 133/77 98.1 °F (36.7 °C) Oral 64 18 96 %   23 1552 (!) 142/79 -- -- 65 18 97 %   23 1358 -- -- -- 73 21 100 %   23 1328 (!) 141/74 -- -- 65 12 97 %   23 1258 (!) 160/87 -- -- 73 21 100 %   23 1228 130/78 -- -- 62 13 100 %         Intake/Output Summary (Last 24 hours) at 2023 1204  Last data filed at 2023 0315  Gross per 24 hour   Intake 200 ml   Output 800 ml   Net -600 ml        I had a face to face encounter and independently examined this patient on

## 2023-05-25 NOTE — PROGRESS NOTES
OCCUPATIONAL THERAPY EVALUATION AND DISCHARGE  Patient: Ursula Son (68 y.o. male)  Date: 5/25/2023  Primary Diagnosis: Dizziness [R42]  Vertigo [R42]       In place during session:EKG/telemetry     ASSESSMENT  Pt is a 81 y.o. male presenting to [facility] with c/o dizzy spells since last week, admitted 5/24 and currently being treated for dizziness and vertigo. Pt received semi-supine in bed upon arrival, AXO x4, and agreeable to OT/PT evaluation. Based on the objective data described below pt currently present near baseline IND for ADLs and function mobility/transfers at this time. (See below for objective details and assist levels). Overall pt tolerated session good today with good understanding of falls risk/safety precautions 2' dizziness. Pt ambulated within the room w/ slow guarded gait req'ing SUP; no LOB or unsteadiness noted (see PT note further details on gait). He completed commode transfer w/ no concern and able to use L grab bar to A. OT educated pt on safety techniques for LB dressing to prevent dizziness; including using cross leg method for donning B socks and pt verbalized understanding. Pt has no skilled acute OT needs at this time either noted by OT or reported by pt, will DC skilled OT following evaluation; pt verbalized understanding and agreement. Current OT DC recommendation Home Self Care once medically appropriate. Pt reports spouse will be home to A if needed. Current Level of Function Impacting Discharge (mobility/balance): IND and demo'd good safety awareness    Other factors to consider for discharge: no additional factors     PLAN :  Recommendations and Planned Interventions: DC from skilled OT services following evaluation.        Rationale for discharge:  Patient currently at functional baseline for transfers/mobility, no further skilled therapy required at this time    Frequency/Duration: Patient will be followed by physical therapy: DC from OT services following evaluation

## 2023-05-25 NOTE — PLAN OF CARE
Complexity : Stable, uncomplicated  Outcome measure: [facility] 6: LOW      Based on the above components, the patient evaluation is determined to be of the following complexity level: LOW    Pain Ratin/10 Headache    Activity Tolerance:   Good    After treatment patient left in no apparent distress:   Bed locked and in lowest position Patient left in no apparent distress in bed and Call bell within reach and nsg updated. COMMUNICATION/EDUCATION:   The patients plan of care was discussed with: Occupational therapist and Registered nurse    Patient Education  Education Given To: Patient  Education Provided: Role of Therapy;Precautions  Education Method: Verbal;Demonstration; Teach Back  Barriers to Learning: None  Education Outcome: Verbalized understanding;Demonstrated understanding    Partial PT/OT session occurred together for increased pt's safety and maximum functional outcome.      Thank you for this referral.  Wilmer Sutton, PT  Minutes: 22

## 2023-05-26 VITALS
HEIGHT: 76 IN | BODY MASS INDEX: 23.75 KG/M2 | RESPIRATION RATE: 18 BRPM | HEART RATE: 72 BPM | SYSTOLIC BLOOD PRESSURE: 143 MMHG | DIASTOLIC BLOOD PRESSURE: 87 MMHG | OXYGEN SATURATION: 100 % | TEMPERATURE: 97.5 F | WEIGHT: 195 LBS

## 2023-05-26 PROCEDURE — 2580000003 HC RX 258: Performed by: HOSPITALIST

## 2023-05-26 PROCEDURE — 6370000000 HC RX 637 (ALT 250 FOR IP): Performed by: GENERAL ACUTE CARE HOSPITAL

## 2023-05-26 PROCEDURE — 6370000000 HC RX 637 (ALT 250 FOR IP): Performed by: HOSPITALIST

## 2023-05-26 RX ORDER — PREDNISONE 20 MG/1
20 TABLET ORAL 2 TIMES DAILY
Qty: 14 TABLET | Refills: 0 | Status: SHIPPED | OUTPATIENT
Start: 2023-05-26 | End: 2023-06-02

## 2023-05-26 RX ORDER — MECLIZINE HYDROCHLORIDE 25 MG/1
25 TABLET ORAL 3 TIMES DAILY
Qty: 90 TABLET | Refills: 0 | Status: SHIPPED | OUTPATIENT
Start: 2023-05-26 | End: 2023-06-25

## 2023-05-26 RX ADMIN — CARVEDILOL 3.12 MG: 3.12 TABLET, FILM COATED ORAL at 09:00

## 2023-05-26 RX ADMIN — AMLODIPINE BESYLATE 5 MG: 5 TABLET ORAL at 09:00

## 2023-05-26 RX ADMIN — MECLIZINE HYDROCHLORIDE 25 MG: 25 TABLET ORAL at 09:01

## 2023-05-26 RX ADMIN — SODIUM CHLORIDE, PRESERVATIVE FREE 10 ML: 5 INJECTION INTRAVENOUS at 09:02

## 2023-05-26 RX ADMIN — PREDNISONE 20 MG: 20 TABLET ORAL at 09:00

## 2023-05-26 NOTE — DISCHARGE SUMMARY
Discharge Summary    Name: Vince Rocha  [id number]  YOB: 1950 (Age: 81 y.o.)   Date of Admission: 5/24/2023  Date of Discharge: 5/26/2023  Attending Physician: Chin Wallace MD      Discharge Diagnosis:       Consultations:  IP CONSULT TO OTOLARYNGOLOGY  IP CONSULT TO OTOLARYNGOLOGY      Brief Admission History/Reason for Admission Per Gino Anderson MD:         [address] Problems:         Discharge Exam:  Patient seen and examined by me on discharge day. Pertinent Findings:  Gen:    Not in distress  Chest: Clear lungs  CVS:   Regular rhythm. No edema  Abd:  Soft, not distended, not tender  Neuro: alert       Patient Vitals for the past 24 hrs:   BP Temp Temp src Pulse Resp SpO2 Weight   05/26/23 0756 (!) 143/87 97.5 °F (36.4 °C) Oral 72 18 100 % --   05/26/23 0559 -- -- -- -- -- -- 88.5 kg (195 lb)   05/25/23 1936 (!) 149/76 98.2 °F (36.8 °C) Oral (!) 101 18 99 % --   05/25/23 1735 -- -- Oral -- -- -- --   05/25/23 1430 139/84 97.5 °F (36.4 °C) Oral 83 18 99 % --         Discharge/Recent Laboratory Results:  Recent Labs     05/24/23  0840   *   K 4.0      CO2 29   BUN 16   CREATININE 0.85   GLUCOSE 143*   CALCIUM 9.1   MG 2.0     Recent Labs     05/24/23  0840   HGB 14.2   HCT 42.0   WBC 5.7        No results found for this or any previous visit. Results       ** No results found for the last 336 hours.  **                Discharge Medications:     Medication List        START taking these medications      meclizine 25 MG tablet  Commonly known as: ANTIVERT  Take 1 tablet by mouth in the morning, at noon, and at bedtime May change to PRN if dizziness resolves or as instructed by your ENT doctor     predniSONE 20 MG tablet  Commonly known as: DELTASONE  Take 1 tablet by mouth 2 times daily for 7 days            CONTINUE taking these medications      albuterol sulfate  (90 Base) MCG/ACT inhaler  Commonly known

## 2023-05-26 NOTE — CARE COORDINATION
DC Plan: Home with family     Transition of Care Plan:    RUR: 6%  Prior Level of Functioning: Independent  Disposition: Home  If SNF or IPR: Date FOC offered: N/A  Date FOC received: N/A  Accepting facility: N/A  Date authorization started with reference number: N/A  Date authorization received and expires: N/A  Follow up appointments: Yes  DME needed: N/A  Transportation at discharge: Family  IM/IMM Medicare/ letter given: Yes  Is patient a  and connected with [facility]? If yes, was [employer] transfer form completed and [facility] notified? N/A  Caregiver Contact: N/A  Discharge Caregiver contacted prior to discharge? N/A  Care Conference needed?  N/A  Barriers to discharge: None

## 2023-05-26 NOTE — DISCHARGE INSTRUCTIONS
instructions. This medicine can affect the results of allergy skin tests. Tell any doctor who treats you that you are using meclizine. Store at room temperature away from moisture, heat, and light. What happens if I miss a dose? Since meclizine is sometimes taken only when needed, you may not be on a dosing schedule. Skip any missed dose if it's almost time for your next dose. Do not use two doses at one time. What happens if I overdose? Seek emergency medical attention or call the Poison Help line at 1-780.643.4803. What should I avoid while taking meclizine? Avoid driving or hazardous activity until you know how this medicine will affect you. Your reactions could be impaired. Drinking alcohol can increase certain side effects of meclizine. What are the possible side effects of meclizine? Get emergency medical help if you have signs of an allergic reaction: hives; difficult breathing; swelling of your face, lips, tongue, or throat. Common side effects may include:  drowsiness;  dry mouth;  headache;  vomiting; or  feeling tired. This is not a complete list of side effects and others may occur. Call your doctor for medical advice about side effects. You may report side effects to FDA at 2-462-DML-6186. What other drugs will affect meclizine? Using meclizine with other drugs that make you drowsy can worsen this effect. Ask your doctor before using opioid medication, a sleeping pill, a muscle relaxer, or medicine for anxiety or seizures. Other drugs may affect meclizine, including prescription and over-the-counter medicines, vitamins, and herbal products. Tell your doctor about all your current medicines and any medicine you start or stop using. Where can I get more information? Your pharmacist can provide more information about meclizine.   Remember, keep this and all other medicines out of the reach of children, never share your medicines with others, and use this medication only for the indication

## 2023-05-26 NOTE — DISCHARGE INSTR - COC
Jaron Caryn  is necessary for the continuing treatment of the diagnosis listed and that he requires {Admit to Appropriate Level of Care:57893} for {GREATER/LESS:923556772} 30 days.      Update Admission H&P: {CHP DME Changes in TINGF:731484286}    PHYSICIAN SIGNATURE:  {Esignature:224214059}

## 2023-06-20 ENCOUNTER — HOSPITAL ENCOUNTER (OUTPATIENT)
Facility: HOSPITAL | Age: 73
Discharge: HOME | End: 2023-06-23
Attending: OTOLARYNGOLOGY
Payer: MEDICARE

## 2023-06-20 VITALS — WEIGHT: 194 LBS | BODY MASS INDEX: 23.61 KG/M2

## 2023-06-20 DIAGNOSIS — H91.22 SUDDEN LEFT HEARING LOSS: ICD-10-CM

## 2023-06-20 PROCEDURE — 70553 MRI BRAIN STEM W/O & W/DYE: CPT

## 2023-06-20 PROCEDURE — A9579 GAD-BASE MR CONTRAST NOS,1ML: HCPCS | Performed by: RADIOLOGY

## 2023-06-20 PROCEDURE — 6360000004 HC RX CONTRAST MEDICATION: Performed by: RADIOLOGY

## 2023-06-20 RX ADMIN — GADOTERIDOL 17 ML: 279.3 INJECTION, SOLUTION INTRAVENOUS at 09:31

## 2023-06-27 ENCOUNTER — TELEPHONE (OUTPATIENT)
Age: 73
End: 2023-06-27

## 2023-10-06 ENCOUNTER — TELEPHONE (OUTPATIENT)
Age: 73
End: 2023-10-06

## 2023-10-06 NOTE — TELEPHONE ENCOUNTER
Lvm for pt in reference to appts on 10/11/23 needing to be cancelled due to the audiologist having a family emergency and then not being able to RS at this time due to the audiologist last day being 10/13/2023.  I stated that we have the pt on the wait list to  call to RS as soon as we have the schedule open for the interim audiologist.

## 2023-10-25 ENCOUNTER — HOSPITAL ENCOUNTER (EMERGENCY)
Facility: HOSPITAL | Age: 73
Discharge: HOME | End: 2023-10-25
Attending: STUDENT IN AN ORGANIZED HEALTH CARE EDUCATION/TRAINING PROGRAM
Payer: MEDICARE

## 2023-10-25 VITALS
SYSTOLIC BLOOD PRESSURE: 159 MMHG | WEIGHT: 195 LBS | HEIGHT: 76 IN | OXYGEN SATURATION: 100 % | HEART RATE: 84 BPM | TEMPERATURE: 98 F | BODY MASS INDEX: 23.75 KG/M2 | RESPIRATION RATE: 15 BRPM | DIASTOLIC BLOOD PRESSURE: 80 MMHG

## 2023-10-25 DIAGNOSIS — T78.2XXA ANAPHYLAXIS, INITIAL ENCOUNTER: Primary | ICD-10-CM

## 2023-10-25 LAB
ALBUMIN SERPL-MCNC: 4.1 G/DL (ref 3.5–5)
ALBUMIN/GLOB SERPL: 1 (ref 1.1–2.2)
ALP SERPL-CCNC: 76 U/L (ref 45–117)
ALT SERPL-CCNC: 54 U/L (ref 12–78)
ANION GAP SERPL CALC-SCNC: 4 MMOL/L (ref 5–15)
AST SERPL W P-5'-P-CCNC: 26 U/L (ref 15–37)
BASOPHILS # BLD: 0 K/UL (ref 0–0.1)
BASOPHILS NFR BLD: 1 % (ref 0–1)
BILIRUB SERPL-MCNC: 0.6 MG/DL (ref 0.2–1)
BUN SERPL-MCNC: 13 MG/DL (ref 6–20)
BUN/CREAT SERPL: 13 (ref 12–20)
CA-I BLD-MCNC: 9.1 MG/DL (ref 8.5–10.1)
CHLORIDE SERPL-SCNC: 101 MMOL/L (ref 97–108)
CO2 SERPL-SCNC: 32 MMOL/L (ref 21–32)
CREAT SERPL-MCNC: 1.01 MG/DL (ref 0.7–1.3)
DIFFERENTIAL METHOD BLD: ABNORMAL
EOSINOPHIL # BLD: 0.3 K/UL (ref 0–0.4)
EOSINOPHIL NFR BLD: 4 % (ref 0–7)
ERYTHROCYTE [DISTWIDTH] IN BLOOD BY AUTOMATED COUNT: 12 % (ref 11.5–14.5)
GLOBULIN SER CALC-MCNC: 4.1 G/DL (ref 2–4)
GLUCOSE SERPL-MCNC: 141 MG/DL (ref 65–100)
HCT VFR BLD AUTO: 44.7 % (ref 36.6–50.3)
HGB BLD-MCNC: 14.9 G/DL (ref 12.1–17)
IMM GRANULOCYTES # BLD AUTO: 0 K/UL (ref 0–0.04)
IMM GRANULOCYTES NFR BLD AUTO: 0 % (ref 0–0.5)
LYMPHOCYTES # BLD: 3.6 K/UL (ref 0.8–3.5)
LYMPHOCYTES NFR BLD: 46 % (ref 12–49)
MCH RBC QN AUTO: 30.2 PG (ref 26–34)
MCHC RBC AUTO-ENTMCNC: 33.3 G/DL (ref 30–36.5)
MCV RBC AUTO: 90.7 FL (ref 80–99)
MONOCYTES # BLD: 1.1 K/UL (ref 0–1)
MONOCYTES NFR BLD: 14 % (ref 5–13)
NEUTS SEG # BLD: 2.7 K/UL (ref 1.8–8)
NEUTS SEG NFR BLD: 35 % (ref 32–75)
PLATELET # BLD AUTO: 153 K/UL (ref 150–400)
PMV BLD AUTO: 10.7 FL (ref 8.9–12.9)
POTASSIUM SERPL-SCNC: 4.5 MMOL/L (ref 3.5–5.1)
PROT SERPL-MCNC: 8.2 G/DL (ref 6.4–8.2)
RBC # BLD AUTO: 4.93 M/UL (ref 4.1–5.7)
SODIUM SERPL-SCNC: 137 MMOL/L (ref 136–145)
WBC # BLD AUTO: 7.8 K/UL (ref 4.1–11.1)

## 2023-10-25 PROCEDURE — 85025 COMPLETE CBC W/AUTO DIFF WBC: CPT

## 2023-10-25 PROCEDURE — 6360000002 HC RX W HCPCS

## 2023-10-25 PROCEDURE — 80053 COMPREHEN METABOLIC PANEL: CPT

## 2023-10-25 PROCEDURE — 2580000003 HC RX 258: Performed by: STUDENT IN AN ORGANIZED HEALTH CARE EDUCATION/TRAINING PROGRAM

## 2023-10-25 PROCEDURE — 99284 EMERGENCY DEPT VISIT MOD MDM: CPT

## 2023-10-25 PROCEDURE — A4216 STERILE WATER/SALINE, 10 ML: HCPCS | Performed by: STUDENT IN AN ORGANIZED HEALTH CARE EDUCATION/TRAINING PROGRAM

## 2023-10-25 PROCEDURE — 96372 THER/PROPH/DIAG INJ SC/IM: CPT

## 2023-10-25 PROCEDURE — 96374 THER/PROPH/DIAG INJ IV PUSH: CPT

## 2023-10-25 PROCEDURE — 6360000002 HC RX W HCPCS: Performed by: STUDENT IN AN ORGANIZED HEALTH CARE EDUCATION/TRAINING PROGRAM

## 2023-10-25 PROCEDURE — 36415 COLL VENOUS BLD VENIPUNCTURE: CPT

## 2023-10-25 PROCEDURE — 2500000003 HC RX 250 WO HCPCS: Performed by: STUDENT IN AN ORGANIZED HEALTH CARE EDUCATION/TRAINING PROGRAM

## 2023-10-25 RX ORDER — 0.9 % SODIUM CHLORIDE 0.9 %
1000 INTRAVENOUS SOLUTION INTRAVENOUS ONCE
Status: COMPLETED | OUTPATIENT
Start: 2023-10-25 | End: 2023-10-25

## 2023-10-25 RX ORDER — METHYLPREDNISOLONE SODIUM SUCCINATE 125 MG/2ML
125 INJECTION, POWDER, LYOPHILIZED, FOR SOLUTION INTRAMUSCULAR; INTRAVENOUS ONCE
Status: DISCONTINUED | OUTPATIENT
Start: 2023-10-25 | End: 2023-10-25 | Stop reason: HOSPADM

## 2023-10-25 RX ORDER — DEXAMETHASONE SODIUM PHOSPHATE 10 MG/ML
10 INJECTION, SOLUTION INTRAMUSCULAR; INTRAVENOUS ONCE
Status: COMPLETED | OUTPATIENT
Start: 2023-10-25 | End: 2023-10-25

## 2023-10-25 RX ORDER — EPINEPHRINE 0.3 MG/.3ML
0.3 INJECTION SUBCUTANEOUS ONCE
Status: DISCONTINUED | OUTPATIENT
Start: 2023-10-25 | End: 2023-10-25 | Stop reason: HOSPADM

## 2023-10-25 RX ORDER — FAMOTIDINE 20 MG/1
20 TABLET, FILM COATED ORAL DAILY
Qty: 20 TABLET | Refills: 0 | Status: SHIPPED | OUTPATIENT
Start: 2023-10-25

## 2023-10-25 RX ORDER — ALBUTEROL SULFATE 2.5 MG/.5ML
2.5 SOLUTION RESPIRATORY (INHALATION)
Status: DISCONTINUED | OUTPATIENT
Start: 2023-10-25 | End: 2023-10-25 | Stop reason: HOSPADM

## 2023-10-25 RX ORDER — EPINEPHRINE 0.3 MG/.3ML
0.3 INJECTION SUBCUTANEOUS ONCE
Qty: 1 EACH | Refills: 0 | Status: SHIPPED | OUTPATIENT
Start: 2023-10-25 | End: 2023-10-25

## 2023-10-25 RX ORDER — DIPHENHYDRAMINE HYDROCHLORIDE 50 MG/ML
25 INJECTION INTRAMUSCULAR; INTRAVENOUS
Status: DISCONTINUED | OUTPATIENT
Start: 2023-10-25 | End: 2023-10-25

## 2023-10-25 RX ORDER — EPINEPHRINE 1 MG/ML
0.3 INJECTION, SOLUTION INTRAMUSCULAR; SUBCUTANEOUS ONCE
Status: DISCONTINUED | OUTPATIENT
Start: 2023-10-25 | End: 2023-10-25 | Stop reason: RX

## 2023-10-25 RX ORDER — EPINEPHRINE 1 MG/ML
0.3 INJECTION, SOLUTION INTRAMUSCULAR; SUBCUTANEOUS ONCE
Status: DISCONTINUED | OUTPATIENT
Start: 2023-10-25 | End: 2023-10-25 | Stop reason: SDUPTHER

## 2023-10-25 RX ORDER — DIPHENHYDRAMINE HYDROCHLORIDE 50 MG/ML
50 INJECTION INTRAMUSCULAR; INTRAVENOUS
Status: COMPLETED | OUTPATIENT
Start: 2023-10-25 | End: 2023-10-25

## 2023-10-25 RX ORDER — EPINEPHRINE 0.3 MG/.3ML
INJECTION SUBCUTANEOUS
Status: DISCONTINUED
Start: 2023-10-25 | End: 2023-10-25 | Stop reason: SDUPTHER

## 2023-10-25 RX ADMIN — FAMOTIDINE 20 MG: 10 INJECTION, SOLUTION INTRAVENOUS at 13:55

## 2023-10-25 RX ADMIN — DIPHENHYDRAMINE HYDROCHLORIDE 50 MG: 50 INJECTION INTRAMUSCULAR; INTRAVENOUS at 13:47

## 2023-10-25 RX ADMIN — SODIUM CHLORIDE 1000 ML: 9 INJECTION, SOLUTION INTRAVENOUS at 13:57

## 2023-10-25 RX ADMIN — ALBUTEROL SULFATE 2.5 MG: 2.5 SOLUTION RESPIRATORY (INHALATION) at 13:50

## 2023-10-25 RX ADMIN — EPINEPHRINE: 0.3 INJECTION INTRAMUSCULAR at 13:37

## 2023-10-25 RX ADMIN — DEXAMETHASONE SODIUM PHOSPHATE 10 MG: 10 INJECTION, SOLUTION INTRAMUSCULAR; INTRAVENOUS at 13:48

## 2023-10-25 ASSESSMENT — PAIN - FUNCTIONAL ASSESSMENT: PAIN_FUNCTIONAL_ASSESSMENT: NONE - DENIES PAIN

## 2023-10-25 NOTE — ED PROVIDER NOTES
[facility] EMERGENCY DEPT  EMERGENCY DEPARTMENT HISTORY AND PHYSICAL EXAM      Date: 10/25/2023  Patient Name: Nicole Bernal  MRN: [id number]  [address] 1950  Date of evaluation: 10/25/2023  Provider: Rose Aguilar MD   Note Started: 2:35 PM EDT 10/25/23    HISTORY OF PRESENT ILLNESS     Chief Complaint   Patient presents with    Allergic Reaction    Facial Swelling       History Provided By: Patient    HPI: Nicole Bernal is a 72 y.o. male presents to the emergency department for trouble breathing, facial swelling, suspect allergic reaction. Patient has no known environmental allergies however states he thinks he may have been bit by a wasp in the shed, approximately 15 minutes prior to arrival started noticing severe swelling to face and throat area, trouble swallowing, trouble breathing. Denies any abdominal pain nausea or vomiting. No chest pain. PAST MEDICAL HISTORY   Past Medical History:  Past Medical History:   Diagnosis Date    Asthma     CAD (coronary artery disease)     Fatty liver     HTN (hypertension)     Migraines     Osteitis pubis ([address]) 12/02/2015       Past Surgical History:  Past Surgical History:   Procedure Laterality Date    CARPAL TUNNEL RELEASE      CERVICAL FUSION      HERNIA REPAIR         Family History:  History reviewed. No pertinent family history. Social History:  Social History     Tobacco Use    Smoking status: Former    Smokeless tobacco: Never   Substance Use Topics    Alcohol use: Yes     Alcohol/week: 0.0 standard drinks of alcohol       Allergies:   Allergies   Allergen Reactions    Aspartame And Phenylalanine Headaches    Guaifenesin Other (See Comments)     Throat swelling    Metoprolol      Other reaction(s): Unknown (comments)    Oxycodone Other (See Comments)     Breathing difficulty    Niacin Rash       PCP: RHIANNON Grimes NP    Current Meds:   Current Facility-Administered Medications   Medication Dose Route Frequency Provider Last Rate Last Admin

## 2023-10-26 ENCOUNTER — HOSPITAL ENCOUNTER (EMERGENCY)
Facility: HOSPITAL | Age: 73
Discharge: HOME | End: 2023-10-26
Attending: EMERGENCY MEDICINE
Payer: MEDICARE

## 2023-10-26 ENCOUNTER — HOSPITAL ENCOUNTER (EMERGENCY)
Facility: HOSPITAL | Age: 73
Discharge: HOME | End: 2023-10-27
Attending: EMERGENCY MEDICINE
Payer: MEDICARE

## 2023-10-26 VITALS
RESPIRATION RATE: 12 BRPM | SYSTOLIC BLOOD PRESSURE: 142 MMHG | WEIGHT: 195 LBS | BODY MASS INDEX: 23.75 KG/M2 | HEART RATE: 72 BPM | OXYGEN SATURATION: 99 % | DIASTOLIC BLOOD PRESSURE: 73 MMHG | HEIGHT: 76 IN | TEMPERATURE: 98 F

## 2023-10-26 DIAGNOSIS — T78.40XA ALLERGIC REACTION, INITIAL ENCOUNTER: Primary | ICD-10-CM

## 2023-10-26 PROCEDURE — 2500000003 HC RX 250 WO HCPCS: Performed by: EMERGENCY MEDICINE

## 2023-10-26 PROCEDURE — 96374 THER/PROPH/DIAG INJ IV PUSH: CPT

## 2023-10-26 PROCEDURE — 96376 TX/PRO/DX INJ SAME DRUG ADON: CPT

## 2023-10-26 PROCEDURE — 6360000002 HC RX W HCPCS: Performed by: EMERGENCY MEDICINE

## 2023-10-26 PROCEDURE — 2580000003 HC RX 258: Performed by: EMERGENCY MEDICINE

## 2023-10-26 PROCEDURE — 99284 EMERGENCY DEPT VISIT MOD MDM: CPT

## 2023-10-26 PROCEDURE — 6360000002 HC RX W HCPCS

## 2023-10-26 PROCEDURE — 96375 TX/PRO/DX INJ NEW DRUG ADDON: CPT

## 2023-10-26 PROCEDURE — A4216 STERILE WATER/SALINE, 10 ML: HCPCS | Performed by: EMERGENCY MEDICINE

## 2023-10-26 RX ORDER — PREDNISONE 20 MG/1
40 TABLET ORAL DAILY
Qty: 10 TABLET | Refills: 0 | Status: SHIPPED | OUTPATIENT
Start: 2023-10-26 | End: 2023-10-31

## 2023-10-26 RX ORDER — DIPHENHYDRAMINE HYDROCHLORIDE 50 MG/ML
25 INJECTION INTRAMUSCULAR; INTRAVENOUS
Status: COMPLETED | OUTPATIENT
Start: 2023-10-26 | End: 2023-10-26

## 2023-10-26 RX ORDER — EPINEPHRINE 0.3 MG/.3ML
0.3 INJECTION SUBCUTANEOUS ONCE
Qty: 0.3 ML | Refills: 0 | Status: SHIPPED | OUTPATIENT
Start: 2023-10-26 | End: 2023-10-26

## 2023-10-26 RX ORDER — DIPHENHYDRAMINE HCL 25 MG
25 CAPSULE ORAL EVERY 6 HOURS PRN
Qty: 20 CAPSULE | Refills: 0 | Status: SHIPPED | OUTPATIENT
Start: 2023-10-26 | End: 2023-11-05

## 2023-10-26 RX ORDER — DEXAMETHASONE SODIUM PHOSPHATE 10 MG/ML
10 INJECTION, SOLUTION INTRAMUSCULAR; INTRAVENOUS ONCE
Status: COMPLETED | OUTPATIENT
Start: 2023-10-26 | End: 2023-10-26

## 2023-10-26 RX ORDER — DIPHENHYDRAMINE HYDROCHLORIDE 50 MG/ML
50 INJECTION INTRAMUSCULAR; INTRAVENOUS ONCE
Status: COMPLETED | OUTPATIENT
Start: 2023-10-26 | End: 2023-10-26

## 2023-10-26 RX ORDER — DIPHENHYDRAMINE HYDROCHLORIDE 50 MG/ML
50 INJECTION INTRAMUSCULAR; INTRAVENOUS
Status: COMPLETED | OUTPATIENT
Start: 2023-10-26 | End: 2023-10-26

## 2023-10-26 RX ORDER — METHYLPREDNISOLONE SODIUM SUCCINATE 125 MG/2ML
125 INJECTION, POWDER, LYOPHILIZED, FOR SOLUTION INTRAMUSCULAR; INTRAVENOUS
Status: COMPLETED | OUTPATIENT
Start: 2023-10-26 | End: 2023-10-26

## 2023-10-26 RX ADMIN — FAMOTIDINE 20 MG: 10 INJECTION, SOLUTION INTRAVENOUS at 23:04

## 2023-10-26 RX ADMIN — METHYLPREDNISOLONE SODIUM SUCCINATE 125 MG: 125 INJECTION, POWDER, LYOPHILIZED, FOR SOLUTION INTRAMUSCULAR; INTRAVENOUS at 11:49

## 2023-10-26 RX ADMIN — DIPHENHYDRAMINE HYDROCHLORIDE 25 MG: 50 INJECTION INTRAMUSCULAR; INTRAVENOUS at 13:08

## 2023-10-26 RX ADMIN — DIPHENHYDRAMINE HYDROCHLORIDE 50 MG: 50 INJECTION INTRAMUSCULAR; INTRAVENOUS at 23:04

## 2023-10-26 RX ADMIN — METHYLPREDNISOLONE SODIUM SUCCINATE 125 MG: 125 INJECTION, POWDER, FOR SOLUTION INTRAMUSCULAR; INTRAVENOUS at 11:49

## 2023-10-26 RX ADMIN — DEXAMETHASONE SODIUM PHOSPHATE 10 MG: 10 INJECTION, SOLUTION INTRAMUSCULAR; INTRAVENOUS at 23:04

## 2023-10-26 RX ADMIN — SODIUM CHLORIDE, PRESERVATIVE FREE 20 MG: 5 INJECTION INTRAVENOUS at 11:52

## 2023-10-26 RX ADMIN — DIPHENHYDRAMINE HYDROCHLORIDE 50 MG: 50 INJECTION INTRAMUSCULAR; INTRAVENOUS at 11:50

## 2023-10-26 ASSESSMENT — PAIN - FUNCTIONAL ASSESSMENT
PAIN_FUNCTIONAL_ASSESSMENT: NONE - DENIES PAIN
PAIN_FUNCTIONAL_ASSESSMENT: NONE - DENIES PAIN

## 2023-10-26 NOTE — ED PROVIDER NOTES
[facility] EMERGENCY DEPT  EMERGENCY DEPARTMENT HISTORY AND PHYSICAL EXAM      Date: 10/26/2023  Patient Name: Magi Solitario  MRN: [id number]  [address] 1950  Date of evaluation: 10/26/2023  Provider: Byron Rucker MD   Note Started: 11:50 AM EDT 10/26/23    HISTORY OF PRESENT ILLNESS     Chief Complaint   Patient presents with    Allergic Reaction    Facial Swelling       History Provided By: Patient    HPI: Magi Solitario is a 72 y.o. male with history of asthma, hypertension presenting with allergic reaction. 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 Reports multiple other environmental allergies. Yesterday he developed facial swelling, difficulty breathing yesterday and came to the ER where he received an EpiPen, albuterol, Pepcid, Benadryl and Decadron. Today when he woke up he started feeling like his throat was closing up again and used one of his home EpiPen's. Denies any new medications or exposures. 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 No pertinent family history. Social History:  Social History     Tobacco Use    Smoking status: Former    Smokeless tobacco: Never   Substance Use Topics    Alcohol use: Yes     Alcohol/week: 0.0 standard drinks of alcohol       Allergies:   Allergies   Allergen Reactions    Aspartame And Phenylalanine Headaches    Guaifenesin Other (See Comments)     Throat swelling    Metoprolol      Other reaction(s): Unknown (comments)    Oxycodone Other (See Comments)     Breathing difficulty    Niacin Rash       PCP: Jodell Severance

## 2023-10-27 VITALS
HEIGHT: 73 IN | DIASTOLIC BLOOD PRESSURE: 78 MMHG | SYSTOLIC BLOOD PRESSURE: 156 MMHG | OXYGEN SATURATION: 100 % | BODY MASS INDEX: 25.84 KG/M2 | RESPIRATION RATE: 18 BRPM | HEART RATE: 89 BPM | WEIGHT: 195 LBS | TEMPERATURE: 98.3 F

## 2023-10-27 NOTE — ED PROVIDER NOTES
Conjunctivae normal.      Pupils: Pupils are equal, round, and reactive to light. Comments: No photophobia. Cardiovascular:      Rate and Rhythm: Normal rate and regular rhythm. Heart sounds: Normal heart sounds. Pulmonary:      Effort: Pulmonary effort is normal. No respiratory distress. Breath sounds: Normal breath sounds. No wheezing, rhonchi or rales. Abdominal:      General: Bowel sounds are normal.      Palpations: Abdomen is soft. Tenderness: There is no abdominal tenderness. There is no right CVA tenderness, left CVA tenderness, guarding or rebound. Musculoskeletal:      Cervical back: Neck supple. No rigidity. Right lower leg: No edema. Left lower leg: No edema. Skin:     General: Skin is warm and dry. Comments: Diffuse erythema of face which patient says is not unusual.  No hives. Neurological:      General: No focal deficit present. Mental Status: He is alert and oriented to person, place, and time. Cranial Nerves: No cranial nerve deficit. Motor: No weakness. Psychiatric:         Mood and Affect: Mood normal.         Behavior: Behavior normal.         Diagnostic Study Results     Labs -   No results found for this or any previous visit (from the past 12 hour(s)). Radiologic Studies -   No orders to display     [unfilled]  [unfilled]      Medical Decision Making   I am the first provider for this patient. I reviewed the vital signs, available nursing notes, past medical history, past surgical history, family history and social history. Patient was treated for angioedema earlier today emergency room came back with complaint of throat swelling. Patient had an injection epi prior to arrival.  On arrival, patient is alert and nontoxic-appearing. Well-hydrated. No respiratory distress. No signs of upper airway obstruction. No obvious visible angioedema. No hives. No clinical symptoms signs suggestive of anaphylaxis.   Patient was given IV

## 2024-01-24 ENCOUNTER — TRANSCRIBE ORDERS (OUTPATIENT)
Facility: HOSPITAL | Age: 74
End: 2024-01-24

## 2024-01-24 DIAGNOSIS — M54.16 LUMBAR RADICULOPATHY: Primary | ICD-10-CM

## 2024-02-08 ENCOUNTER — HOSPITAL ENCOUNTER (OUTPATIENT)
Facility: HOSPITAL | Age: 74
Discharge: HOME | End: 2024-02-08
Attending: ORTHOPAEDIC SURGERY
Payer: MEDICARE

## 2024-02-08 DIAGNOSIS — M54.16 LUMBAR RADICULOPATHY: ICD-10-CM

## 2024-02-08 PROCEDURE — 72148 MRI LUMBAR SPINE W/O DYE: CPT

## 2025-05-22 ENCOUNTER — HOSPITAL ENCOUNTER (EMERGENCY)
Facility: HOSPITAL | Age: 75
Discharge: HOME | End: 2025-05-22
Attending: EMERGENCY MEDICINE
Payer: MEDICARE

## 2025-05-22 ENCOUNTER — APPOINTMENT (OUTPATIENT)
Facility: HOSPITAL | Age: 75
End: 2025-05-22
Payer: MEDICARE

## 2025-05-22 VITALS
OXYGEN SATURATION: 100 % | WEIGHT: 193 LBS | TEMPERATURE: 97.5 F | DIASTOLIC BLOOD PRESSURE: 82 MMHG | HEART RATE: 69 BPM | SYSTOLIC BLOOD PRESSURE: 165 MMHG | BODY MASS INDEX: 25.58 KG/M2 | RESPIRATION RATE: 17 BRPM | HEIGHT: 73 IN

## 2025-05-22 DIAGNOSIS — S09.90XA INJURY OF HEAD, INITIAL ENCOUNTER: Primary | ICD-10-CM

## 2025-05-22 DIAGNOSIS — S01.01XA LACERATION OF SCALP, INITIAL ENCOUNTER: ICD-10-CM

## 2025-05-22 PROCEDURE — 99284 EMERGENCY DEPT VISIT MOD MDM: CPT

## 2025-05-22 PROCEDURE — 6370000000 HC RX 637 (ALT 250 FOR IP): Performed by: EMERGENCY MEDICINE

## 2025-05-22 PROCEDURE — 12001 RPR S/N/AX/GEN/TRNK 2.5CM/<: CPT

## 2025-05-22 PROCEDURE — 70450 CT HEAD/BRAIN W/O DYE: CPT

## 2025-05-22 RX ORDER — CEPHALEXIN 500 MG/1
500 CAPSULE ORAL 3 TIMES DAILY
Qty: 21 CAPSULE | Refills: 0 | Status: SHIPPED | OUTPATIENT
Start: 2025-05-22 | End: 2025-05-29

## 2025-05-22 RX ORDER — GINSENG 100 MG
CAPSULE ORAL
Status: COMPLETED | OUTPATIENT
Start: 2025-05-22 | End: 2025-05-22

## 2025-05-22 RX ORDER — LIDOCAINE AND PRILOCAINE 25; 25 MG/G; MG/G
CREAM TOPICAL ONCE
Status: COMPLETED | OUTPATIENT
Start: 2025-05-22 | End: 2025-05-22

## 2025-05-22 RX ADMIN — LIDOCAINE AND PRILOCAINE: 25; 25 CREAM TOPICAL at 20:30

## 2025-05-22 RX ADMIN — BACITRACIN: 500 OINTMENT TOPICAL at 21:16

## 2025-05-22 ASSESSMENT — PAIN DESCRIPTION - DESCRIPTORS: DESCRIPTORS: ACHING;THROBBING;PRESSURE

## 2025-05-22 ASSESSMENT — LIFESTYLE VARIABLES
HOW MANY STANDARD DRINKS CONTAINING ALCOHOL DO YOU HAVE ON A TYPICAL DAY: 1 OR 2
HOW OFTEN DO YOU HAVE A DRINK CONTAINING ALCOHOL: MONTHLY OR LESS

## 2025-05-22 ASSESSMENT — PAIN - FUNCTIONAL ASSESSMENT: PAIN_FUNCTIONAL_ASSESSMENT: 0-10

## 2025-05-22 ASSESSMENT — PAIN DESCRIPTION - LOCATION: LOCATION: HEAD

## 2025-05-22 ASSESSMENT — PAIN SCALES - GENERAL: PAINLEVEL_OUTOF10: 6

## 2025-05-22 NOTE — ED TRIAGE NOTES
Patient jumped and hit on overhang on boat while on a lift. Erica shot up to date ( last 2 years). Denies LOC. Not on a blood thinner. Headache, pressure and throbbing    Bleeding controlled in triage.     Aleve taken at 1800   Yes

## 2025-05-23 NOTE — ED PROVIDER NOTES
[facility] EMERGENCY DEPT  EMERGENCY DEPARTMENT HISTORY AND PHYSICAL EXAM      Date: 5/22/2025  Patient Name: Mansoor Saldana  MRN: [id number]  Birthdate 1950  Date of evaluation: 5/22/2025  Provider: Marilyn Grubbs MD  Note Started: 9:12 PM EDT 5/22/25    HISTORY OF PRESENT ILLNESS     Chief Complaint   Patient presents with    Laceration    Headache    Head Injury       History Provided By: Patient    HPI: Mansoor Saldana is a 74 y.o. male.  Patient presents with a complaint of head injury and a laceration to his scalp when he had an overhang on a boat.  Incident occurred just prior to arrival.  No LOC.  No altered mental status.  No nausea vomiting.  No neck pain.  No other injury.  Tetanus immunization up-to-date.  Patient not on anticoagulation        PAST MEDICAL HISTORY   Past Medical History:  Past Medical History:   Diagnosis Date    Asthma     CAD (coronary artery disease)     Fatty liver     HTN (hypertension)     Migraines     Osteitis pubis ([facility]) 12/02/2015       Past Surgical History:  Past Surgical History:   Procedure Laterality Date    CARPAL TUNNEL RELEASE      CERVICAL FUSION      HERNIA REPAIR         Family History:  History reviewed. No pertinent family history.    Social History:  Social History     Tobacco Use    Smoking status: Former    Smokeless tobacco: Never   Substance Use Topics    Alcohol use: Yes     Alcohol/week: 0.0 standard drinks of alcohol    Drug use: Never       Allergies:  Allergies   Allergen Reactions    Aspartame And Phenylalanine Headaches    Guaifenesin Other (See Comments)     Throat swelling    Metoprolol      Other reaction(s): Unknown (comments)    Oxycodone Other (See Comments)     Breathing difficulty    Niacin Rash       PCP: Aster Faria APRN - NP    Current Meds:   Current Facility-Administered Medications   Medication Dose Route Frequency Provider Last Rate Last Admin    bacitracin ointment   Topical NOW Marilyn Amezquita MD        lidocaine-prilocaine